# Patient Record
Sex: MALE | Race: WHITE | NOT HISPANIC OR LATINO | Employment: FULL TIME | URBAN - METROPOLITAN AREA
[De-identification: names, ages, dates, MRNs, and addresses within clinical notes are randomized per-mention and may not be internally consistent; named-entity substitution may affect disease eponyms.]

---

## 2017-02-06 ENCOUNTER — TRANSCRIBE ORDERS (OUTPATIENT)
Dept: LAB | Facility: HOSPITAL | Age: 52
End: 2017-02-06

## 2017-02-06 ENCOUNTER — APPOINTMENT (OUTPATIENT)
Dept: LAB | Facility: HOSPITAL | Age: 52
End: 2017-02-06

## 2017-02-06 DIAGNOSIS — L40.50 ARTHROPATHIC PSORIASIS, UNSPECIFIED (HCC): Primary | ICD-10-CM

## 2017-02-06 DIAGNOSIS — M72.2 PLANTAR FASCIAL FIBROMATOSIS: ICD-10-CM

## 2017-02-06 DIAGNOSIS — R94.5 ABNORMAL RESULTS OF LIVER FUNCTION STUDIES: ICD-10-CM

## 2017-02-06 LAB
ALBUMIN SERPL-MCNC: 4.2 G/DL (ref 3.2–4.8)
ALBUMIN/GLOB SERPL: 1.5 G/DL (ref 1.5–2.5)
ALP SERPL-CCNC: 92 U/L (ref 25–100)
ALT SERPL W P-5'-P-CCNC: 40 U/L (ref 7–40)
ANION GAP SERPL CALCULATED.3IONS-SCNC: 6 MMOL/L (ref 3–11)
AST SERPL-CCNC: 29 U/L (ref 0–33)
BILIRUB SERPL-MCNC: 0.3 MG/DL (ref 0.3–1.2)
BUN BLD-MCNC: 14 MG/DL (ref 9–23)
BUN/CREAT SERPL: 14 (ref 7–25)
CALCIUM SPEC-SCNC: 10 MG/DL (ref 8.7–10.4)
CHLORIDE SERPL-SCNC: 105 MMOL/L (ref 99–109)
CO2 SERPL-SCNC: 28 MMOL/L (ref 20–31)
CREAT BLD-MCNC: 1 MG/DL (ref 0.6–1.3)
DEPRECATED RDW RBC AUTO: 46.6 FL (ref 37–54)
ERYTHROCYTE [DISTWIDTH] IN BLOOD BY AUTOMATED COUNT: 13.4 % (ref 11.3–14.5)
GFR SERPL CREATININE-BSD FRML MDRD: 78 ML/MIN/1.73
GLOBULIN UR ELPH-MCNC: 2.8 GM/DL
GLUCOSE BLD-MCNC: 90 MG/DL (ref 70–100)
HCT VFR BLD AUTO: 41.5 % (ref 38.9–50.9)
HGB BLD-MCNC: 14.3 G/DL (ref 13.1–17.5)
MCH RBC QN AUTO: 32.6 PG (ref 27–31)
MCHC RBC AUTO-ENTMCNC: 34.5 G/DL (ref 32–36)
MCV RBC AUTO: 94.7 FL (ref 80–99)
PLATELET # BLD AUTO: 253 10*3/MM3 (ref 150–450)
PMV BLD AUTO: 11.3 FL (ref 6–12)
POTASSIUM BLD-SCNC: 4.1 MMOL/L (ref 3.5–5.5)
PROT SERPL-MCNC: 7 G/DL (ref 5.7–8.2)
RBC # BLD AUTO: 4.38 10*6/MM3 (ref 4.2–5.76)
SODIUM BLD-SCNC: 139 MMOL/L (ref 132–146)
WBC NRBC COR # BLD: 7.05 10*3/MM3 (ref 3.5–10.8)

## 2017-02-06 PROCEDURE — 85027 COMPLETE CBC AUTOMATED: CPT | Performed by: INTERNAL MEDICINE

## 2017-02-06 PROCEDURE — 36415 COLL VENOUS BLD VENIPUNCTURE: CPT | Performed by: INTERNAL MEDICINE

## 2017-02-06 PROCEDURE — 80053 COMPREHEN METABOLIC PANEL: CPT | Performed by: INTERNAL MEDICINE

## 2017-02-20 RX ORDER — VALACYCLOVIR HYDROCHLORIDE 1 G/1
TABLET, FILM COATED ORAL
Qty: 90 TABLET | Refills: 3 | Status: SHIPPED | OUTPATIENT
Start: 2017-02-20 | End: 2018-02-26 | Stop reason: SDUPTHER

## 2017-04-07 ENCOUNTER — TRANSCRIBE ORDERS (OUTPATIENT)
Dept: LAB | Facility: HOSPITAL | Age: 52
End: 2017-04-07

## 2017-04-07 ENCOUNTER — APPOINTMENT (OUTPATIENT)
Dept: LAB | Facility: HOSPITAL | Age: 52
End: 2017-04-07

## 2017-04-07 DIAGNOSIS — M47.812 SPONDYLOSIS OF CERVICAL REGION WITHOUT MYELOPATHY OR RADICULOPATHY: ICD-10-CM

## 2017-04-07 DIAGNOSIS — M72.2 PLANTAR FASCIAL FIBROMATOSIS: ICD-10-CM

## 2017-04-07 DIAGNOSIS — R94.5 ABNORMAL RESULTS OF LIVER FUNCTION STUDIES: ICD-10-CM

## 2017-04-07 DIAGNOSIS — L40.50 PSORIATIC ARTHROPATHY (HCC): Primary | ICD-10-CM

## 2017-04-07 DIAGNOSIS — M75.102 UNSPECIFIED ROTATOR CUFF TEAR OR RUPTURE OF LEFT SHOULDER, NOT SPECIFIED AS TRAUMATIC: ICD-10-CM

## 2017-04-07 LAB
ALBUMIN SERPL-MCNC: 4.3 G/DL (ref 3.2–4.8)
ALBUMIN/GLOB SERPL: 1.5 G/DL (ref 1.5–2.5)
ALP SERPL-CCNC: 90 U/L (ref 25–100)
ALT SERPL W P-5'-P-CCNC: 38 U/L (ref 7–40)
ANION GAP SERPL CALCULATED.3IONS-SCNC: 4 MMOL/L (ref 3–11)
AST SERPL-CCNC: 28 U/L (ref 0–33)
BILIRUB SERPL-MCNC: 0.4 MG/DL (ref 0.3–1.2)
BUN BLD-MCNC: 18 MG/DL (ref 9–23)
BUN/CREAT SERPL: 16.4 (ref 7–25)
CALCIUM SPEC-SCNC: 10.2 MG/DL (ref 8.7–10.4)
CHLORIDE SERPL-SCNC: 103 MMOL/L (ref 99–109)
CO2 SERPL-SCNC: 32 MMOL/L (ref 20–31)
CREAT BLD-MCNC: 1.1 MG/DL (ref 0.6–1.3)
DEPRECATED RDW RBC AUTO: 48.1 FL (ref 37–54)
ERYTHROCYTE [DISTWIDTH] IN BLOOD BY AUTOMATED COUNT: 13.7 % (ref 11.3–14.5)
GFR SERPL CREATININE-BSD FRML MDRD: 70 ML/MIN/1.73
GLOBULIN UR ELPH-MCNC: 2.9 GM/DL
GLUCOSE BLD-MCNC: 84 MG/DL (ref 70–100)
HCT VFR BLD AUTO: 44.7 % (ref 38.9–50.9)
HGB BLD-MCNC: 15.2 G/DL (ref 13.1–17.5)
MCH RBC QN AUTO: 33.3 PG (ref 27–31)
MCHC RBC AUTO-ENTMCNC: 34 G/DL (ref 32–36)
MCV RBC AUTO: 97.8 FL (ref 80–99)
PLATELET # BLD AUTO: 256 10*3/MM3 (ref 150–450)
PMV BLD AUTO: 11.6 FL (ref 6–12)
POTASSIUM BLD-SCNC: 4.3 MMOL/L (ref 3.5–5.5)
PROT SERPL-MCNC: 7.2 G/DL (ref 5.7–8.2)
RBC # BLD AUTO: 4.57 10*6/MM3 (ref 4.2–5.76)
SODIUM BLD-SCNC: 139 MMOL/L (ref 132–146)
WBC NRBC COR # BLD: 7.68 10*3/MM3 (ref 3.5–10.8)

## 2017-04-07 PROCEDURE — 85027 COMPLETE CBC AUTOMATED: CPT | Performed by: INTERNAL MEDICINE

## 2017-04-07 PROCEDURE — 36415 COLL VENOUS BLD VENIPUNCTURE: CPT | Performed by: INTERNAL MEDICINE

## 2017-04-07 PROCEDURE — 80053 COMPREHEN METABOLIC PANEL: CPT | Performed by: INTERNAL MEDICINE

## 2017-04-07 RX ORDER — LEVOCETIRIZINE DIHYDROCHLORIDE 5 MG/1
TABLET, FILM COATED ORAL
Qty: 90 TABLET | Refills: 3 | Status: SHIPPED | OUTPATIENT
Start: 2017-04-07 | End: 2018-04-24 | Stop reason: SDUPTHER

## 2017-06-23 RX ORDER — FLUTICASONE PROPIONATE 50 MCG
SPRAY, SUSPENSION (ML) NASAL
Qty: 48 G | Refills: 3 | Status: SHIPPED | OUTPATIENT
Start: 2017-06-23 | End: 2018-01-29 | Stop reason: SDUPTHER

## 2017-07-19 ENCOUNTER — TELEPHONE (OUTPATIENT)
Dept: SURGERY | Facility: CLINIC | Age: 52
End: 2017-07-19

## 2017-07-21 ENCOUNTER — PREP FOR SURGERY (OUTPATIENT)
Dept: OTHER | Facility: HOSPITAL | Age: 52
End: 2017-07-21

## 2017-07-21 DIAGNOSIS — Z12.11 COLON CANCER SCREENING: Primary | ICD-10-CM

## 2017-07-28 ENCOUNTER — HOSPITAL ENCOUNTER (OUTPATIENT)
Dept: GENERAL RADIOLOGY | Facility: HOSPITAL | Age: 52
Discharge: HOME | End: 2017-07-28
Attending: INTERNAL MEDICINE | Admitting: INTERNAL MEDICINE

## 2017-07-28 ENCOUNTER — OFFICE VISIT (OUTPATIENT)
Dept: INTERNAL MEDICINE | Facility: CLINIC | Age: 52
End: 2017-07-28

## 2017-07-28 VITALS
HEART RATE: 96 BPM | OXYGEN SATURATION: 95 % | WEIGHT: 192 LBS | DIASTOLIC BLOOD PRESSURE: 70 MMHG | TEMPERATURE: 98.6 F | SYSTOLIC BLOOD PRESSURE: 110 MMHG | BODY MASS INDEX: 26.88 KG/M2 | HEIGHT: 71 IN

## 2017-07-28 DIAGNOSIS — Z00.00 ROUTINE GENERAL MEDICAL EXAMINATION AT A HEALTH CARE FACILITY: Primary | ICD-10-CM

## 2017-07-28 DIAGNOSIS — Z00.00 ROUTINE GENERAL MEDICAL EXAMINATION AT A HEALTH CARE FACILITY: ICD-10-CM

## 2017-07-28 PROCEDURE — 99396 PREV VISIT EST AGE 40-64: CPT | Performed by: INTERNAL MEDICINE

## 2017-07-28 PROCEDURE — 71020 HC CHEST PA AND LATERAL: CPT

## 2017-07-28 NOTE — PROGRESS NOTES
Chief Complaint   Patient presents with   • Annual Exam     Physical       Ezio Becerra is a 51 y.o. male and is here for a comprehensive physical exam. The patient reports problems - chronic cough, bilat foot pain.     History:  No nocturia  Has ED    Do you take any herbs or supplements that were not prescribed by a doctor? yes  Are you taking calcium supplements? no  Are you taking aspirin daily? no      Health Habits:  Dental Exam. up to date  Eye Exam. unknown  Exercise: 1 times/week.  Current exercise activities include: light weights/kettlebells and walking    Health Maintenance   Topic Date Due   • HEPATITIS C SCREENING  05/27/2016   • TDAP/TD VACCINES (2 - Td) 07/28/2016   • INFLUENZA VACCINE  08/01/2017   • COLONOSCOPY  10/06/2024       PMH, PSH, SocHx, FamHx, Allergies, and Medications: Reviewed and updated in the Visit Navigator.     Allergies   Allergen Reactions   • Septra [Sulfamethoxazole-Trimethoprim]      No past medical history on file.  No past surgical history on file.  Social History     Social History   • Marital status:      Spouse name: N/A   • Number of children: N/A   • Years of education: N/A     Occupational History   • Not on file.     Social History Main Topics   • Smoking status: Never Smoker   • Smokeless tobacco: Never Used   • Alcohol use Not on file   • Drug use: Not on file   • Sexual activity: Not on file     Other Topics Concern   • Not on file     Social History Narrative     No family history on file.    Review of Systems  Review of Systems   Constitutional: Negative.  Negative for activity change, appetite change, fatigue and fever.   HENT: Negative for congestion, ear discharge, ear pain and trouble swallowing.    Eyes: Negative for photophobia and visual disturbance.   Respiratory: Positive for cough. Negative for shortness of breath.    Cardiovascular: Negative for chest pain and palpitations.   Gastrointestinal: Negative for abdominal distention, abdominal  "pain, constipation, diarrhea, nausea and vomiting.   Endocrine: Negative.    Genitourinary: Negative for dysuria, hematuria and urgency.   Musculoskeletal: Negative for arthralgias, back pain, joint swelling and myalgias.        Bilat foot pain   Skin: Negative for color change and rash.   Allergic/Immunologic: Negative.    Neurological: Negative for dizziness, weakness, light-headedness and headaches.   Hematological: Negative for adenopathy. Does not bruise/bleed easily.   Psychiatric/Behavioral: Negative for agitation, confusion and dysphoric mood. The patient is not nervous/anxious.        Vitals:    07/28/17 1539   BP: 110/70   Pulse: 96   Temp: 98.6 °F (37 °C)   SpO2: 95%       Objective   /70  Pulse 96  Temp 98.6 °F (37 °C)  Ht 71\" (180.3 cm)  Wt 192 lb (87.1 kg)  SpO2 95%  BMI 26.78 kg/m2    Physical Exam   Constitutional: He is oriented to person, place, and time. He appears well-developed and well-nourished. No distress.   HENT:   Nose: Nose normal.   Mouth/Throat: Oropharynx is clear and moist.   Eyes: Conjunctivae and EOM are normal. No scleral icterus.   Neck: No tracheal deviation present. No thyromegaly present.   Cardiovascular: Normal rate and regular rhythm.  Exam reveals no friction rub.    No murmur heard.  Pulmonary/Chest: No respiratory distress. He has no wheezes. He has no rales.   Abdominal: Soft. He exhibits no distension and no mass. There is no tenderness. There is no guarding.   Genitourinary: Prostate normal.   Musculoskeletal: Normal range of motion. He exhibits no deformity.   Lymphadenopathy:     He has no cervical adenopathy.   Neurological: He is alert and oriented to person, place, and time. He has normal reflexes. No cranial nerve deficit. Coordination normal.   Skin: Skin is warm and dry. No rash noted. No erythema.   Psychiatric: He has a normal mood and affect. His behavior is normal. Judgment and thought content normal.        Assessment/Plan   1. Healthy male " exam.   2. Patient Counseling: Including but not Limited to the following, when appropriate:  --Nutrition: Stressed importance of moderation in sodium/caffeine intake, saturated fat and cholesterol, caloric balance, sufficient intake of fresh fruits, vegetables, fiber, calcium, iron, and     --Exercise: Stressed the importance of regular exercise.       --Sexuality: Discussed sexually transmitted diseases, partner selection, use of condoms, avoidance of unintended pregnancy  and contraceptive alternatives.   --Injury prevention: Discussed safety belts, safety helmets, smoke detector, smoking near bedding or upholstery.   --Dental health: Discussed importance of regular tooth brushing, flossing, and dental visits.  --Immunizations reviewed.  --Discussed benefits of colon cancer screening.      3. Discussed the patient's BMI with him.  The BMI is in the acceptable range  4. No Follow-up on file.  5. Age-appropriate Screening Scheduled  6. There are no Patient Instructions on file for this visit.    Assessment/Plan     There are no diagnoses linked to this encounter.

## 2017-08-01 ENCOUNTER — LAB (OUTPATIENT)
Dept: LAB | Facility: HOSPITAL | Age: 52
End: 2017-08-01

## 2017-08-01 DIAGNOSIS — Z79.899 ENCOUNTER FOR LONG-TERM (CURRENT) USE OF MEDICATIONS: ICD-10-CM

## 2017-08-01 DIAGNOSIS — L40.50 ARTHROPATHIC PSORIASIS (HCC): Primary | ICD-10-CM

## 2017-08-01 DIAGNOSIS — R94.5 NONSPECIFIC ABNORMAL RESULTS OF LIVER FUNCTION STUDY: ICD-10-CM

## 2017-08-01 DIAGNOSIS — M47.812 SPONDYLOSIS WITHOUT MYELOPATHY OR RADICULOPATHY, CERVICAL REGION: ICD-10-CM

## 2017-08-01 DIAGNOSIS — M72.2 PLANTAR FASCIAL FIBROMATOSIS: ICD-10-CM

## 2017-08-01 LAB
ALBUMIN SERPL-MCNC: 4.1 G/DL (ref 3.2–4.8)
ALBUMIN/GLOB SERPL: 1.3 G/DL (ref 1.5–2.5)
ALP SERPL-CCNC: 84 U/L (ref 25–100)
ALT SERPL W P-5'-P-CCNC: 47 U/L (ref 7–40)
ANION GAP SERPL CALCULATED.3IONS-SCNC: 4 MMOL/L (ref 3–11)
ARTICHOKE IGE QN: 129 MG/DL (ref 0–130)
AST SERPL-CCNC: 38 U/L (ref 0–33)
BILIRUB SERPL-MCNC: 0.4 MG/DL (ref 0.3–1.2)
BUN BLD-MCNC: 17 MG/DL (ref 9–23)
BUN/CREAT SERPL: 15.5 (ref 7–25)
CALCIUM SPEC-SCNC: 9.4 MG/DL (ref 8.7–10.4)
CHLORIDE SERPL-SCNC: 104 MMOL/L (ref 99–109)
CHOLEST SERPL-MCNC: 185 MG/DL (ref 0–200)
CO2 SERPL-SCNC: 30 MMOL/L (ref 20–31)
CREAT BLD-MCNC: 1.1 MG/DL (ref 0.6–1.3)
DEPRECATED RDW RBC AUTO: 48.4 FL (ref 37–54)
ERYTHROCYTE [DISTWIDTH] IN BLOOD BY AUTOMATED COUNT: 13.7 % (ref 11.3–14.5)
GFR SERPL CREATININE-BSD FRML MDRD: 70 ML/MIN/1.73
GLOBULIN UR ELPH-MCNC: 3.1 GM/DL
GLUCOSE BLD-MCNC: 90 MG/DL (ref 70–100)
HCT VFR BLD AUTO: 44.8 % (ref 38.9–50.9)
HDLC SERPL-MCNC: 36 MG/DL (ref 40–60)
HGB BLD-MCNC: 15.1 G/DL (ref 13.1–17.5)
MCH RBC QN AUTO: 32.8 PG (ref 27–31)
MCHC RBC AUTO-ENTMCNC: 33.7 G/DL (ref 32–36)
MCV RBC AUTO: 97.4 FL (ref 80–99)
PLATELET # BLD AUTO: 199 10*3/MM3 (ref 150–450)
PMV BLD AUTO: 11.1 FL (ref 6–12)
POTASSIUM BLD-SCNC: 4.4 MMOL/L (ref 3.5–5.5)
PROT SERPL-MCNC: 7.2 G/DL (ref 5.7–8.2)
PSA SERPL-MCNC: 0.91 NG/ML (ref 0–4)
RBC # BLD AUTO: 4.6 10*6/MM3 (ref 4.2–5.76)
SODIUM BLD-SCNC: 138 MMOL/L (ref 132–146)
TRIGL SERPL-MCNC: 193 MG/DL (ref 0–150)
WBC NRBC COR # BLD: 8.36 10*3/MM3 (ref 3.5–10.8)

## 2017-08-01 PROCEDURE — 80053 COMPREHEN METABOLIC PANEL: CPT | Performed by: INTERNAL MEDICINE

## 2017-08-01 PROCEDURE — 80061 LIPID PANEL: CPT | Performed by: INTERNAL MEDICINE

## 2017-08-01 PROCEDURE — 85027 COMPLETE CBC AUTOMATED: CPT | Performed by: INTERNAL MEDICINE

## 2017-08-01 PROCEDURE — 36415 COLL VENOUS BLD VENIPUNCTURE: CPT | Performed by: INTERNAL MEDICINE

## 2017-08-01 PROCEDURE — 84153 ASSAY OF PSA TOTAL: CPT | Performed by: INTERNAL MEDICINE

## 2017-08-04 RX ORDER — PSEUDOEPHEDRINE HCL 120 MG/1
60 TABLET, FILM COATED, EXTENDED RELEASE ORAL DAILY
COMMUNITY
End: 2022-04-01

## 2017-08-11 ENCOUNTER — HOSPITAL ENCOUNTER (OUTPATIENT)
Facility: HOSPITAL | Age: 52
Setting detail: HOSPITAL OUTPATIENT SURGERY
Discharge: HOME | End: 2017-08-11
Attending: SURGERY | Admitting: SURGERY

## 2017-08-11 ENCOUNTER — ANESTHESIA EVENT (OUTPATIENT)
Dept: GASTROENTEROLOGY | Facility: HOSPITAL | Age: 52
End: 2017-08-11

## 2017-08-11 ENCOUNTER — ANESTHESIA (OUTPATIENT)
Dept: GASTROENTEROLOGY | Facility: HOSPITAL | Age: 52
End: 2017-08-11

## 2017-08-11 VITALS
BODY MASS INDEX: 25.9 KG/M2 | WEIGHT: 185 LBS | DIASTOLIC BLOOD PRESSURE: 75 MMHG | OXYGEN SATURATION: 97 % | RESPIRATION RATE: 16 BRPM | TEMPERATURE: 97.8 F | SYSTOLIC BLOOD PRESSURE: 114 MMHG | HEART RATE: 67 BPM | HEIGHT: 71 IN

## 2017-08-11 PROCEDURE — 25010000002 MIDAZOLAM PER 1 MG: Performed by: NURSE ANESTHETIST, CERTIFIED REGISTERED

## 2017-08-11 PROCEDURE — 25010000002 PROPOFOL 1000 MG/100ML EMULSION: Performed by: NURSE ANESTHETIST, CERTIFIED REGISTERED

## 2017-08-11 PROCEDURE — 25010000002 FENTANYL CITRATE (PF) 100 MCG/2ML SOLUTION: Performed by: NURSE ANESTHETIST, CERTIFIED REGISTERED

## 2017-08-11 PROCEDURE — S0260 H&P FOR SURGERY: HCPCS | Performed by: SURGERY

## 2017-08-11 RX ORDER — PROPOFOL 10 MG/ML
INJECTION, EMULSION INTRAVENOUS AS NEEDED
Status: DISCONTINUED | OUTPATIENT
Start: 2017-08-11 | End: 2017-08-11 | Stop reason: SURG

## 2017-08-11 RX ORDER — SODIUM CHLORIDE, SODIUM LACTATE, POTASSIUM CHLORIDE, CALCIUM CHLORIDE 600; 310; 30; 20 MG/100ML; MG/100ML; MG/100ML; MG/100ML
1000 INJECTION, SOLUTION INTRAVENOUS CONTINUOUS PRN
Status: DISCONTINUED | OUTPATIENT
Start: 2017-08-11 | End: 2017-08-11 | Stop reason: HOSPADM

## 2017-08-11 RX ORDER — FENTANYL CITRATE 50 UG/ML
INJECTION, SOLUTION INTRAMUSCULAR; INTRAVENOUS AS NEEDED
Status: DISCONTINUED | OUTPATIENT
Start: 2017-08-11 | End: 2017-08-11 | Stop reason: SURG

## 2017-08-11 RX ORDER — SODIUM CHLORIDE 0.9 % (FLUSH) 0.9 %
3 SYRINGE (ML) INJECTION AS NEEDED
Status: DISCONTINUED | OUTPATIENT
Start: 2017-08-11 | End: 2017-08-11 | Stop reason: HOSPADM

## 2017-08-11 RX ORDER — ONDANSETRON 2 MG/ML
4 INJECTION INTRAMUSCULAR; INTRAVENOUS ONCE AS NEEDED
Status: DISCONTINUED | OUTPATIENT
Start: 2017-08-11 | End: 2017-08-11 | Stop reason: HOSPADM

## 2017-08-11 RX ORDER — MIDAZOLAM HYDROCHLORIDE 1 MG/ML
INJECTION INTRAMUSCULAR; INTRAVENOUS AS NEEDED
Status: DISCONTINUED | OUTPATIENT
Start: 2017-08-11 | End: 2017-08-11 | Stop reason: SURG

## 2017-08-11 RX ADMIN — SODIUM CHLORIDE, SODIUM LACTATE, POTASSIUM CHLORIDE, AND CALCIUM CHLORIDE 1000 ML: 600; 310; 30; 20 INJECTION, SOLUTION INTRAVENOUS at 08:06

## 2017-08-11 RX ADMIN — FENTANYL CITRATE 100 MCG: 50 INJECTION, SOLUTION INTRAMUSCULAR; INTRAVENOUS at 08:20

## 2017-08-11 RX ADMIN — MIDAZOLAM HYDROCHLORIDE 2 MG: 1 INJECTION, SOLUTION INTRAMUSCULAR; INTRAVENOUS at 08:17

## 2017-08-11 RX ADMIN — PROPOFOL 50 MG: 10 INJECTION, EMULSION INTRAVENOUS at 08:19

## 2017-08-11 NOTE — ANESTHESIA PREPROCEDURE EVALUATION
Anesthesia Evaluation     Patient summary reviewed and Nursing notes reviewed   NPO Solid Status: > 8 hours  NPO Liquid Status: > 8 hours     Airway   no difficulty expected  Dental - normal exam     Pulmonary - normal exam   Cardiovascular - normal exam  Exercise tolerance: unable to assess        Neuro/Psych  GI/Hepatic/Renal/Endo      Musculoskeletal     (+) arthralgias, back pain, chronic pain,   Abdominal  - normal exam   Substance History      OB/GYN          Other   (+) arthritis                                     Anesthesia Plan    ASA 3     MAC   (Risks and benefits discussed including risk of aspiration, recall and dental damage. All patient questions answered. Will continue with POC.)  intravenous induction   Anesthetic plan and risks discussed with patient.

## 2017-08-11 NOTE — PLAN OF CARE
Problem: GI Endoscopy (NICU)  Goal: Signs and Symptoms of Listed Potential Problems Will be Absent or Manageable (GI Endoscopy)  Outcome: Ongoing (interventions implemented as appropriate)

## 2017-08-11 NOTE — ANESTHESIA POSTPROCEDURE EVALUATION
Patient: Ezio Becerra    Procedure Summary     Date Anesthesia Start Anesthesia Stop Room / Location    08/11/17 0815  [facility] ENDOSCOPY 3 / [facility]COPY       Procedure Diagnosis Surgeon Provider    COLONOSCOPY (N/A ) Colon cancer screening  (Colon cancer screening [Z12.11]) MD Antione Yoder CRNA          Anesthesia Type: MAC  Last vitals  BP   115/65 (08/11/17 0801)    Temp   98 °F (36.7 °C) (08/11/17 0801)    Pulse   73 (08/11/17 0801)   Resp   16 (08/11/17 0801)    SpO2   97 % (08/11/17 0801)      Post Anesthesia Care and Evaluation    Patient location during evaluation: PHASE II  Patient participation: complete - patient participated  Level of consciousness: awake  Pain score: 0  Pain management: adequate  Airway patency: patent  Anesthetic complications: No anesthetic complications  PONV Status: none  Cardiovascular status: acceptable  Respiratory status: acceptable and nasal cannula  Hydration status: acceptable    Comments: vsss resp spont, reflexes intact, responsive, report given to pacu nurse

## 2017-08-11 NOTE — H&P
[facility]   HISTORY AND PHYSICAL      Name:  Ezio Becerra   Age:  51 y.o.  Sex:  male  :  1965  MRN:  [id number]   Visit Number:  [id number]  Admission Date:  2017  Date Of Service:  17  Primary Care Physician:  Ethan Garcia MD    Chief Complaint:     History of colon polyps    History Of Presenting Illness:      Patient here for 5 year follow-up colonoscopy with polyp.  No symptoms and positive family history grandfather having colon cancer.  Symptoms    Review Of Systems:     General ROS: Patient denies any fevers, chills or loss of consciousness.  No complaints of generalized weakness  Psychological ROS: Denies any hallucinations and delusions.  Ophthalmic ROS: no transient loss of vision.  ENT ROS: Denies sore throat, nasal congestion or ear pain.   Allergy and Immunology ROS: Denies rash or itching.  Hematological and Lymphatic ROS: Denies neck swelling or easy bleeding.  Endocrine ROS: Denies any recent unintentional weight gain or loss.  Breast ROS: Denies any pain or swelling.  Respiratory ROS: Denies cough or shortness of breath.   Cardiovascular ROS: Denies chest pain or palpitations. No history of exertional chest pain.   Gastrointestinal ROS: Denies nausea and vomiting. Denies any abdominal pain. No diarrhea.   Genito-Urinary ROS: Denies dysuria or hematuria.  Musculoskeletal ROS: no back pain. No muscle pain. No calf pain.   Neurological ROS: Denies any focal weakness. No loss of consciousness. Denies any numbness.   Dermatological ROS: Denies any redness or pruritis.     Past Medical History:    Past Medical History:   Diagnosis Date   • DDD (degenerative disc disease), cervical    • DDD (degenerative disc disease), lumbar    • Plantar fasciitis     BILATERAL   • Psoriatic arthritis    • Seasonal allergies        Past Surgical history:    Past Surgical History:   Procedure Laterality Date   • COLONOSCOPY     • WISDOM TOOTH EXTRACTION          Social History:    Social History     Social History   • Marital status:      Spouse name: N/A   • Number of children: N/A   • Years of education: N/A     Occupational History   • Not on file.     Social History Main Topics   • Smoking status: Never Smoker   • Smokeless tobacco: Never Used   • Alcohol use Yes      Comment: OCCASSIONAL   • Drug use: No   • Sexual activity: Defer     Other Topics Concern   • Not on file     Social History Narrative       Family History:    History reviewed. No pertinent family history.    Allergies:      Septra [sulfamethoxazole-trimethoprim]    Home Medications:    Prior to Admission Medications     Prescriptions Last Dose Informant Patient Reported? Taking?    fluticasone (FLONASE) 50 MCG/ACT nasal spray 8/11/2017  No Yes    USE 2 SPRAYS IN EACH       NOSTRIL ONCE DAILY, PLEASE MAKE AN APPOINTMENT WITH   YOUR DOCTOR    Fluticasone Furoate-Vilanterol (BREO ELLIPTA IN) 8/11/2017  Yes Yes    Inhale 1 puff Daily.    folic acid (FOLVITE) 1 MG tablet 8/10/2017  Yes Yes    Take 1 mg by mouth Daily.    levocetirizine (XYZAL) 5 MG tablet 8/10/2017  No Yes    TAKE 1 TABLET DAILY. PLEASEMAKE AN APPOINTMENT WITH   YOUR DOCTOR    methotrexate 2.5 MG tablet 8/10/2017  Yes Yes    Take 2.5 mg by mouth. Take 6 tablets once a week.    naproxen (NAPROSYN) 500 MG tablet Past Week  Yes Yes    Take 500 mg by mouth 3 (Three) Times a Day As Needed.    pimecrolimus (ELIDEL) 1 % cream More than a month  Yes No    Apply 1 application topically 2 (Two) Times a Day As Needed.    pseudoephedrine (SUDAFED) 120 MG 12 hr tablet 8/10/2017  Yes Yes    Take 60 mg by mouth Daily.    sildenafil (VIAGRA) 100 MG tablet Past Month  Yes Yes    Take 100 mg by mouth As Needed.    tacrolimus (PROTOPIC) 0.1 % ointment Past Week  Yes Yes    Apply 1 application topically Daily As Needed.    valACYclovir (VALTREX) 1000 MG tablet 8/10/2017  No Yes    TAKE 1 TABLET DAILY             ED Medications:    Medications    sodium chloride 0.9 % flush 3 mL (not administered)   lactated ringers infusion 1,000 mL (1,000 mL Intravenous New Bag 8/11/17 0806)       Vital Signs:    Temp:  [98 °F (36.7 °C)] 98 °F (36.7 °C)  Heart Rate:  [73] 73  Resp:  [16] 16  BP: (115)/(65) 115/65    Last 3 weights    08/04/17  1101   Weight: 185 lb (83.9 kg)       Body mass index is 25.8 kg/(m^2).    Physical Exam:      General Appearance:  Alert and cooperative, not in any acute distress.   Head:  Atraumatic and normocephalic, without obvious abnormality.   Eyes:          PERRLA, conjunctivae and sclerae normal, no Icterus. No pallor. Extra-occular movements are within normal limits.   Ears:  Ears appear intact with no abnormalities noted.   Throat: No oral lesions, no thrush, oral mucosa moist.   Neck: Supple, trachea midline, no thyromegaly, no carotid bruit.       Respiratory/Lungs:   Breath sounds heard bilaterally equally.  No crackles or wheezing. No Pleural rub or bronchial breathing. Normal respiratory effort.    Cardiovascular/Heart:  Normal S1 and S2, no murmur. No edema   GI/Abdomen:   No masses, no hepatosplenomegaly. Soft, non-tender, non-distended, no hernia                 Musculoskeletal/ Extremities:   Moves all extremities well   Pulses: Pulses palpable and equal bilaterally   Skin: No bleeding, bruising or rash, no induration   Lymph nodes: No palpable adenopathy   Psychiatric : Alert and oriented ×3.  No depression or anxiety            EKG:      None    Labs:    Lab Results (last 24 hours)     ** No results found for the last 24 hours. **          Radiology:    Imaging Results (last 72 hours)     ** No results found for the last 72 hours. **          Assessment:    Family history colon cancer, personal history of colon polyps     Plan:     Colonoscopy today, risk of bleeding perforation discussed and patient agreeable    Joseph Rucker MD  08/11/17  8:16 AM

## 2017-08-11 NOTE — PLAN OF CARE
Problem: GI Endoscopy (NICU)  Goal: Signs and Symptoms of Listed Potential Problems Will be Absent or Manageable (GI Endoscopy)  Outcome: Ongoing (interventions implemented as appropriate)    08/11/17 0752   GI Endoscopy   Problems Assessed (GI Endoscopy) all   Problems Present (GI Endoscopy) none

## 2017-08-18 ENCOUNTER — TELEPHONE (OUTPATIENT)
Dept: INTERNAL MEDICINE | Facility: CLINIC | Age: 52
End: 2017-08-18

## 2017-08-18 DIAGNOSIS — R05.9 COUGH: Primary | ICD-10-CM

## 2017-08-18 NOTE — TELEPHONE ENCOUNTER
Patient called stating that he had seen Dr. Jose nielsen ago and he was given a medication to try. He states he told him to let him know if he decided to see a specialist, (Pulmonology) and he would order a referral. Patient would like to do this, if possible.

## 2017-09-05 ENCOUNTER — TELEPHONE (OUTPATIENT)
Dept: INTERNAL MEDICINE | Facility: CLINIC | Age: 52
End: 2017-09-05

## 2017-09-05 NOTE — TELEPHONE ENCOUNTER
----- Message from Ezio Becerra sent at 9/1/2017 10:01 AM EDT -----  Regarding: RE: Non-Urgent Medical Question  Contact: [phone]  I'm due for a refill on my methotrexate.  Would like to get 90 day supply from [employer], 6 tabs/week.      Do I need to schedule an rosa't to get a refill or should I schedule my next rosa't for Nov?    My most recent labwork from Aug 1 is on the portal.    Sim  ----- Message -----  From: Ethan Garcia MD  Sent: 8/31/2017  6:06 PM EDT  To: Ezio Becerra  Subject: RE: Non-Urgent Medical Question    Yes we can take care of it here. We will need to see you every 6 months and monitor your labs.    ----- Message -----     From: Ezio Becerra     Sent: 8/30/2017  9:15 PM EDT       To: Ethan Garcia MD  Subject: Non-Urgent Medical Question    I am currently under maintenance dose of six 2.5 mg tablets/week of methotrexate.  I currently see Dr Cheney for semi annual check ups.  Is this something I can do through your office?

## 2017-09-06 ENCOUNTER — TELEPHONE (OUTPATIENT)
Dept: INTERNAL MEDICINE | Facility: CLINIC | Age: 52
End: 2017-09-06
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
## 2017-09-08 ENCOUNTER — CLINICAL SUPPORT (OUTPATIENT)
Dept: INTERNAL MEDICINE | Facility: CLINIC | Age: 52
End: 2017-09-08

## 2017-09-08 PROCEDURE — 90715 TDAP VACCINE 7 YRS/> IM: CPT | Performed by: INTERNAL MEDICINE

## 2017-09-08 PROCEDURE — 90471 IMMUNIZATION ADMIN: CPT | Performed by: INTERNAL MEDICINE

## 2017-10-09 ENCOUNTER — APPOINTMENT (OUTPATIENT)
Dept: LAB | Facility: HOSPITAL | Age: 52
End: 2017-10-09

## 2017-10-09 ENCOUNTER — TRANSCRIBE ORDERS (OUTPATIENT)
Dept: LAB | Facility: HOSPITAL | Age: 52
End: 2017-10-09

## 2017-10-09 DIAGNOSIS — L40.50 PSORIATIC ARTHROPATHY (HCC): ICD-10-CM

## 2017-10-09 DIAGNOSIS — Z79.899 HIGH RISK MEDICATIONS (NOT ANTICOAGULANTS) LONG-TERM USE: Primary | ICD-10-CM

## 2017-10-09 DIAGNOSIS — R94.5 LIVER FUNCTION STUDY, ABNORMAL: ICD-10-CM

## 2017-10-09 DIAGNOSIS — M72.2 PLANTAR FASCIAL FIBROMATOSIS: ICD-10-CM

## 2017-10-09 LAB
ALBUMIN SERPL-MCNC: 4.2 G/DL (ref 3.2–4.8)
ALBUMIN/GLOB SERPL: 1.5 G/DL (ref 1.5–2.5)
ALP SERPL-CCNC: 97 U/L (ref 25–100)
ALT SERPL W P-5'-P-CCNC: 39 U/L (ref 7–40)
ANION GAP SERPL CALCULATED.3IONS-SCNC: 6 MMOL/L (ref 3–11)
AST SERPL-CCNC: 27 U/L (ref 0–33)
BILIRUB SERPL-MCNC: 0.5 MG/DL (ref 0.3–1.2)
BUN BLD-MCNC: 16 MG/DL (ref 9–23)
BUN/CREAT SERPL: 16 (ref 7–25)
CALCIUM SPEC-SCNC: 9.4 MG/DL (ref 8.7–10.4)
CHLORIDE SERPL-SCNC: 110 MMOL/L (ref 99–109)
CO2 SERPL-SCNC: 26 MMOL/L (ref 20–31)
CREAT BLD-MCNC: 1 MG/DL (ref 0.6–1.3)
DEPRECATED RDW RBC AUTO: 47.6 FL (ref 37–54)
ERYTHROCYTE [DISTWIDTH] IN BLOOD BY AUTOMATED COUNT: 13.7 % (ref 11.3–14.5)
GFR SERPL CREATININE-BSD FRML MDRD: 78 ML/MIN/1.73
GLOBULIN UR ELPH-MCNC: 2.8 GM/DL
GLUCOSE BLD-MCNC: 73 MG/DL (ref 70–100)
HCT VFR BLD AUTO: 44.5 % (ref 38.9–50.9)
HGB BLD-MCNC: 15.4 G/DL (ref 13.1–17.5)
MCH RBC QN AUTO: 33 PG (ref 27–31)
MCHC RBC AUTO-ENTMCNC: 34.6 G/DL (ref 32–36)
MCV RBC AUTO: 95.3 FL (ref 80–99)
PLATELET # BLD AUTO: 244 10*3/MM3 (ref 150–450)
PMV BLD AUTO: 11.3 FL (ref 6–12)
POTASSIUM BLD-SCNC: 4.1 MMOL/L (ref 3.5–5.5)
PROT SERPL-MCNC: 7 G/DL (ref 5.7–8.2)
RBC # BLD AUTO: 4.67 10*6/MM3 (ref 4.2–5.76)
SODIUM BLD-SCNC: 142 MMOL/L (ref 132–146)
WBC NRBC COR # BLD: 7.25 10*3/MM3 (ref 3.5–10.8)

## 2017-10-09 PROCEDURE — 80053 COMPREHEN METABOLIC PANEL: CPT | Performed by: INTERNAL MEDICINE

## 2017-10-09 PROCEDURE — 36415 COLL VENOUS BLD VENIPUNCTURE: CPT | Performed by: INTERNAL MEDICINE

## 2017-10-09 PROCEDURE — 85027 COMPLETE CBC AUTOMATED: CPT | Performed by: INTERNAL MEDICINE

## 2017-10-12 ENCOUNTER — TELEPHONE (OUTPATIENT)
Dept: INTERNAL MEDICINE | Facility: CLINIC | Age: 52
End: 2017-10-12

## 2017-10-12 NOTE — TELEPHONE ENCOUNTER
----- Message from Ethan Garcia MD sent at 10/11/2017 11:55 AM EDT -----  Please inform patient labs are okay.

## 2017-10-13 ENCOUNTER — TELEPHONE (OUTPATIENT)
Dept: INTERNAL MEDICINE | Facility: CLINIC | Age: 52
End: 2017-10-13

## 2017-11-01 ENCOUNTER — OFFICE VISIT (OUTPATIENT)
Dept: PULMONOLOGY | Facility: CLINIC | Age: 52
End: 2017-11-01

## 2017-11-01 VITALS
RESPIRATION RATE: 16 BRPM | DIASTOLIC BLOOD PRESSURE: 78 MMHG | SYSTOLIC BLOOD PRESSURE: 110 MMHG | BODY MASS INDEX: 27.16 KG/M2 | WEIGHT: 194 LBS | HEART RATE: 84 BPM | OXYGEN SATURATION: 97 % | HEIGHT: 71 IN

## 2017-11-01 DIAGNOSIS — J30.89 OTHER ALLERGIC RHINITIS: ICD-10-CM

## 2017-11-01 DIAGNOSIS — R06.83 SNORING: ICD-10-CM

## 2017-11-01 DIAGNOSIS — R05.3 COUGH, PERSISTENT: Primary | ICD-10-CM

## 2017-11-01 PROCEDURE — 99244 OFF/OP CNSLTJ NEW/EST MOD 40: CPT | Performed by: INTERNAL MEDICINE

## 2017-11-01 RX ORDER — AZELASTINE 1 MG/ML
1 SPRAY, METERED NASAL 2 TIMES DAILY PRN
Qty: 1 EACH | Refills: 1 | Status: SHIPPED | OUTPATIENT
Start: 2017-11-01 | End: 2019-02-01

## 2017-11-01 NOTE — PROGRESS NOTES
"CONSULT NOTE    Requested by: Ethan Garcia MD      Chief Complaint   Patient presents with   • Consult   • Cough       Subjective   Ezio Becerra is a 52 y.o. male.     History of Present Illness   Patient comes in today for consultation because of cough.  The patient says that over the past 2-3 years he's had a bronchitis once a year and usually keeps a cough for about a few weeks after that but this year the cough has persisted.  Upon questioning he denies any exacerbating or triggering factors.  He also denies any fever or chills.    The patient denies any more or pets at home.  The patient denies any family history of asthma or personal history of asthma.    The patient does complain of runny nose and dribbling in the back of his throat which is usually well controlled with Flonase although occasionally it does worsen.    The patient says that the cough is productive of clear sputum and is mostly a problem early in the morning.  He denies any symptoms as the day progresses.  The patient does not have any shortness of breath.    The patient used to take allergy shots but stopped them 10 years ago.  He does have occasional seasonal allergies.    The patient has tried Prilosec as well as Brio but stopped using them as he did not feel that they helped his symptoms.  He does not have daily symptoms and sometimes will have no cough whatsoever for 1 week before having a \"bad day\".    The following portions of the patient's history were reviewed and updated as appropriate: allergies, current medications, past family history, past medical history, past social history and past surgical history.    Review of Systems   Constitutional: Negative for chills, fatigue and fever.   HENT: Negative for congestion, postnasal drip, rhinorrhea, sinus pressure, sneezing and sore throat.    Respiratory: Positive for cough and wheezing. Negative for chest tightness and shortness of breath.    Cardiovascular: Negative for chest " "pain, palpitations and leg swelling.   Psychiatric/Behavioral: Positive for sleep disturbance.   All other systems reviewed and are negative.      Past Medical History:   Diagnosis Date   • DDD (degenerative disc disease), cervical    • DDD (degenerative disc disease), lumbar    • Plantar fasciitis     BILATERAL   • Psoriatic arthritis    • Seasonal allergies        Social History   Substance Use Topics   • Smoking status: Never Smoker   • Smokeless tobacco: Never Used   • Alcohol use Yes      Comment: OCCASSIONAL       Objective   Visit Vitals   • /78   • Pulse 84   • Resp 16   • Ht 71\" (180.3 cm)   • Wt 194 lb (88 kg)   • SpO2 97%   • BMI 27.06 kg/m2       Physical Exam   Constitutional: He is oriented to person, place, and time. He appears well-developed and well-nourished.   HENT:   Sinuses were non tender on palpation.  Nostril showed mild erythema.  Oropharynx was moist.   Eyes: EOM are normal.   Neck: Neck supple. No JVD present. No thyromegaly present.   Cardiovascular: Normal rate and regular rhythm.    No murmur heard.  Pulmonary/Chest: Effort normal. No respiratory distress. He has no wheezes. He has no rales.   Musculoskeletal:   Gait was normal.   Neurological: He is alert and oriented to person, place, and time.   Skin: Skin is warm and dry.   Psychiatric: He has a normal mood and affect. His behavior is normal.   Vitals reviewed.      Assessment/Plan   Ezio was seen today for consult and cough.    Diagnoses and all orders for this visit:    Cough, persistent    Other allergic rhinitis    Snoring    Other orders  -     azelastine (ASTELIN) 0.1 % nasal spray; 1 spray into each nostril 2 (Two) Times a Day As Needed for Rhinitis or Allergies. Use in each nostril as directed         Return in about 5 months (around 4/1/2018) for Recheck.    DISCUSSION(if any):  I have reviewed the patient's imaging studies and shared them with him. The last Chest X Ray was normal.     ===========================  " ===========================    His cough is likely secondary to Post Nasal Drip likely from allergic rhinitis.    I have advised him to use Flonase BID and also given him a prescription for Azelastine for PRN use.     If he continues to have cough or if the frequency increases or symptoms worsen, then I will consider further work up.    He doesn't seem to have any symptoms to suggest asthma.       Dictated utilizing Dragon dictation.    This document was electronically signed by Florentino Martinez MD November 1, 2017  11:46 AM

## 2017-11-10 ENCOUNTER — OFFICE VISIT (OUTPATIENT)
Dept: INTERNAL MEDICINE | Facility: CLINIC | Age: 52
End: 2017-11-10

## 2017-11-10 VITALS
DIASTOLIC BLOOD PRESSURE: 81 MMHG | HEART RATE: 90 BPM | HEIGHT: 71 IN | SYSTOLIC BLOOD PRESSURE: 115 MMHG | OXYGEN SATURATION: 98 % | BODY MASS INDEX: 27.02 KG/M2 | TEMPERATURE: 98 F | WEIGHT: 193 LBS

## 2017-11-10 DIAGNOSIS — R05.9 COUGH: Primary | ICD-10-CM

## 2017-11-10 DIAGNOSIS — L40.9 PSORIASIS: ICD-10-CM

## 2017-11-10 DIAGNOSIS — N52.9 MALE ERECTILE DISORDER: ICD-10-CM

## 2017-11-10 PROCEDURE — 99214 OFFICE O/P EST MOD 30 MIN: CPT | Performed by: INTERNAL MEDICINE

## 2018-01-15 RX ORDER — FOLIC ACID 1 MG/1
1 TABLET ORAL DAILY
Qty: 100 TABLET | Refills: 3 | Status: SHIPPED | OUTPATIENT
Start: 2018-01-15 | End: 2019-02-01

## 2018-01-29 RX ORDER — FLUTICASONE PROPIONATE 50 MCG
2 SPRAY, SUSPENSION (ML) NASAL DAILY
Qty: 48 G | Refills: 3 | Status: SHIPPED | OUTPATIENT
Start: 2018-01-29 | End: 2019-02-01 | Stop reason: SDUPTHER

## 2018-02-02 DIAGNOSIS — L40.9 PSORIASIS: Primary | ICD-10-CM

## 2018-02-02 LAB
ALBUMIN SERPL-MCNC: 4.4 G/DL (ref 3.5–5)
ALBUMIN/GLOB SERPL: 1.5 G/DL (ref 1–2)
ALP SERPL-CCNC: 86 U/L (ref 38–126)
ALT SERPL-CCNC: 66 U/L (ref 13–69)
AST SERPL-CCNC: 34 U/L (ref 15–46)
BILIRUB SERPL-MCNC: 0.4 MG/DL (ref 0.2–1.3)
BUN SERPL-MCNC: 22 MG/DL (ref 7–20)
BUN/CREAT SERPL: 18.3 (ref 6.3–21.9)
CALCIUM SERPL-MCNC: 10.5 MG/DL (ref 8.4–10.2)
CHLORIDE SERPL-SCNC: 102 MMOL/L (ref 98–107)
CO2 SERPL-SCNC: 29 MMOL/L (ref 26–30)
CREAT SERPL-MCNC: 1.2 MG/DL (ref 0.6–1.3)
ERYTHROCYTE [DISTWIDTH] IN BLOOD BY AUTOMATED COUNT: 13.2 % (ref 11.5–14.5)
GFR SERPLBLD CREATININE-BSD FMLA CKD-EPI: 63 ML/MIN/1.73
GFR SERPLBLD CREATININE-BSD FMLA CKD-EPI: 77 ML/MIN/1.73
GLOBULIN SER CALC-MCNC: 2.9 GM/DL
GLUCOSE SERPL-MCNC: 73 MG/DL (ref 74–98)
HCT VFR BLD AUTO: 44 % (ref 42–52)
HGB BLD-MCNC: 15.3 G/DL (ref 14–18)
MCH RBC QN AUTO: 33.3 PG (ref 27–31)
MCHC RBC AUTO-ENTMCNC: 34.8 G/DL (ref 30–37)
MCV RBC AUTO: 95.9 FL (ref 80–94)
PLATELET # BLD AUTO: 270 10*3/MM3 (ref 130–400)
POTASSIUM SERPL-SCNC: 4.7 MMOL/L (ref 3.5–5.1)
PROT SERPL-MCNC: 7.3 G/DL (ref 6.3–8.2)
RBC # BLD AUTO: 4.59 10*6/MM3 (ref 4.7–6.1)
SODIUM SERPL-SCNC: 143 MMOL/L (ref 137–145)
WBC # BLD AUTO: 8.63 10*3/MM3 (ref 4.8–10.8)

## 2018-02-26 ENCOUNTER — TELEPHONE (OUTPATIENT)
Dept: INTERNAL MEDICINE | Facility: CLINIC | Age: 53
End: 2018-02-26

## 2018-02-26 RX ORDER — VALACYCLOVIR HYDROCHLORIDE 1 G/1
1000 TABLET, FILM COATED ORAL DAILY
Qty: 90 TABLET | Refills: 3 | Status: SHIPPED | OUTPATIENT
Start: 2018-02-26 | End: 2019-03-03 | Stop reason: SDUPTHER

## 2018-02-26 NOTE — TELEPHONE ENCOUNTER
----- Message from Ezio Becerra sent at 2/24/2018  5:45 PM EST -----  Regarding: Prescription Question  Contact: [phone]  Request refills for 90 day supply of generic Valtrex equivalent thru mail order [facility], phone # [phone]

## 2018-02-26 NOTE — TELEPHONE ENCOUNTER
Patients medication has been sent to the pharmacy.    Patient has been notified via [employer].

## 2018-04-23 ENCOUNTER — PATIENT MESSAGE (OUTPATIENT)
Dept: INTERNAL MEDICINE | Facility: CLINIC | Age: 53
End: 2018-04-23

## 2018-04-24 RX ORDER — LEVOCETIRIZINE DIHYDROCHLORIDE 5 MG/1
5 TABLET, FILM COATED ORAL EVERY EVENING
Qty: 90 TABLET | Refills: 3 | Status: SHIPPED | OUTPATIENT
Start: 2018-04-24 | End: 2019-02-01 | Stop reason: SDUPTHER

## 2018-04-24 NOTE — TELEPHONE ENCOUNTER
From: Ezio Becerra  To: Ethan Garcia MD  Sent: 4/23/2018 8:26 PM EDT  Subject: Prescription Question    Request a refill of levocetirizine 5 mg, 90 tablets through mail order [facility] [phone]

## 2018-04-25 DIAGNOSIS — M25.561 RIGHT KNEE PAIN, UNSPECIFIED CHRONICITY: Primary | ICD-10-CM

## 2018-04-30 ENCOUNTER — OFFICE VISIT (OUTPATIENT)
Dept: ORTHOPEDIC SURGERY | Facility: CLINIC | Age: 53
End: 2018-04-30

## 2018-04-30 VITALS — WEIGHT: 195 LBS | BODY MASS INDEX: 27.3 KG/M2 | RESPIRATION RATE: 18 BRPM | HEIGHT: 71 IN

## 2018-04-30 DIAGNOSIS — M25.561 RIGHT KNEE PAIN, UNSPECIFIED CHRONICITY: Primary | ICD-10-CM

## 2018-04-30 PROCEDURE — 99204 OFFICE O/P NEW MOD 45 MIN: CPT | Performed by: ORTHOPAEDIC SURGERY

## 2018-04-30 NOTE — PROGRESS NOTES
Subjective   Patient ID: Ezio Becerra is a 52 y.o. male  Pain of the Right Knee (Patient says his right knee started hurting a couple months ago, he don't recall any injuries, but thinks he has arthritis.)             History of Present Illness    Several months of soreness in the right knee poorly localized aches feel stiff at times no fall injury clicking locking buckling giving way, no improvement with methotrexate, he does take methotrexate chronically for psoriasis.  Has history of degenerative low back disease psoriasis and other medical issues none orthopedic    Review of Systems   Constitutional: Negative for fever.   HENT: Negative for voice change.    Eyes: Negative for visual disturbance.   Respiratory: Negative for shortness of breath.    Cardiovascular: Negative for chest pain.   Gastrointestinal: Negative for abdominal distention and abdominal pain.   Genitourinary: Negative for dysuria.   Musculoskeletal: Positive for arthralgias. Negative for gait problem and joint swelling.   Skin: Negative for rash.   Neurological: Negative for speech difficulty.   Hematological: Does not bruise/bleed easily.   Psychiatric/Behavioral: Negative for confusion.       Past Medical History:   Diagnosis Date   • DDD (degenerative disc disease), cervical    • DDD (degenerative disc disease), lumbar    • Plantar fasciitis     BILATERAL   • Psoriatic arthritis    • Seasonal allergies         Past Surgical History:   Procedure Laterality Date   • COLONOSCOPY  2012   • COLONOSCOPY N/A 8/11/2017    Procedure: COLONOSCOPY;  Surgeon: Joseph Rucker MD;  Location: [facility] ENDOSCOPY;  Service:    • WISDOM TOOTH EXTRACTION         Family History   Problem Relation Age of Onset   • COPD Father    • Ovarian cancer Sister        Social History     Social History   • Marital status:      Spouse name: N/A   • Number of children: N/A   • Years of education: N/A     Occupational History   • Not on file.     Social History Main  "Topics   • Smoking status: Never Smoker   • Smokeless tobacco: Never Used   • Alcohol use Yes      Comment: OCCASSIONAL   • Drug use: No   • Sexual activity: Defer     Other Topics Concern   • Not on file     Social History Narrative   • No narrative on file       I have reviewed all of the above social hx, family hx, surgical hx, medications, allergies & ROS and confirm that it is accurate.    Allergies   Allergen Reactions   • Septra [Sulfamethoxazole-Trimethoprim]          Current Outpatient Prescriptions:   •  azelastine (ASTELIN) 0.1 % nasal spray, 1 spray into each nostril 2 (Two) Times a Day As Needed for Rhinitis or Allergies. Use in each nostril as directed, Disp: 1 each, Rfl: 1  •  fluticasone (FLONASE) 50 MCG/ACT nasal spray, 2 sprays into each nostril Daily., Disp: 48 g, Rfl: 3  •  folic acid (FOLVITE) 1 MG tablet, Take 1 tablet by mouth Daily., Disp: 100 tablet, Rfl: 3  •  levocetirizine (XYZAL) 5 MG tablet, Take 1 tablet by mouth Every Evening., Disp: 90 tablet, Rfl: 3  •  methotrexate 2.5 MG tablet, Take 6 tablets once a week., Disp: 72 tablet, Rfl: 3  •  naproxen (NAPROSYN) 500 MG tablet, Take 500 mg by mouth 3 (Three) Times a Day As Needed., Disp: , Rfl:   •  pimecrolimus (ELIDEL) 1 % cream, Apply 1 application topically 2 (Two) Times a Day As Needed., Disp: , Rfl:   •  pseudoephedrine (SUDAFED) 120 MG 12 hr tablet, Take 60 mg by mouth Daily., Disp: , Rfl:   •  sildenafil (VIAGRA) 100 MG tablet, Take 100 mg by mouth As Needed., Disp: , Rfl:   •  tacrolimus (PROTOPIC) 0.1 % ointment, Apply 1 application topically Daily As Needed., Disp: , Rfl:   •  valACYclovir (VALTREX) 1000 MG tablet, Take 1 tablet by mouth Daily., Disp: 90 tablet, Rfl: 3    Objective   Resp 18   Ht 180.3 cm (71\")   Wt 88.5 kg (195 lb)   BMI 27.20 kg/m²    Physical Exam  Constitutional: Patient is oriented to person, place, and time. Patient appears well-developed and well-nourished.   HENT:Head: Normocephalic and atraumatic. "   Eyes: EOM are normal. Pupils are equal, round, and reactive to light.   Neck: Normal range of motion. Neck supple.   Cardiovascular: Normal rate.    Pulmonary/Chest: Effort normal and breath sounds normal.   Abdominal: Soft.   Neurological: Patient is alert and oriented to person, place, and time.   Skin: Skin is warm and dry.   Psychiatric: Patient has a normal mood and affect.   Nursing note and vitals reviewed.       Ortho Exam   Right knee with no effusion full extension mild positive patellofemoral crepitus positive patellofemoral apprehension sign, negative Lai sign negative Lockman sign Supple no atrophy around the quadriceps, extensor mechanism intact stable no swelling ecchymosis or bruising.  Doing intact    Assessment/Plan   Review of Radiographic Studies:    Radiographic images today of affected area I personally viewed and showed no sign of acute fracture or dislocation.      Procedures     Ezio was seen today for pain.    Diagnoses and all orders for this visit:    Right knee pain, unspecified chronicity  -     Ambulatory Referral to Physical Therapy Evaluate and treat       Physical therapy referral given      Recommendations/Plan:   Exercise, medications, injections, other patient advice, and return appointment as noted.    Patient agreeable to call or return sooner for any concerns.             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

## 2019-02-01 ENCOUNTER — OFFICE VISIT (OUTPATIENT)
Dept: INTERNAL MEDICINE | Facility: CLINIC | Age: 54
End: 2019-02-01

## 2019-02-01 VITALS
HEART RATE: 79 BPM | BODY MASS INDEX: 28 KG/M2 | WEIGHT: 200 LBS | OXYGEN SATURATION: 98 % | DIASTOLIC BLOOD PRESSURE: 70 MMHG | HEIGHT: 71 IN | SYSTOLIC BLOOD PRESSURE: 114 MMHG | TEMPERATURE: 98 F

## 2019-02-01 DIAGNOSIS — N52.9 MALE ERECTILE DISORDER: ICD-10-CM

## 2019-02-01 DIAGNOSIS — L40.9 PSORIASIS: Primary | ICD-10-CM

## 2019-02-01 PROBLEM — Z12.11 COLON CANCER SCREENING: Status: RESOLVED | Noted: 2017-07-21 | Resolved: 2019-02-01

## 2019-02-01 PROCEDURE — 99396 PREV VISIT EST AGE 40-64: CPT | Performed by: INTERNAL MEDICINE

## 2019-02-01 RX ORDER — NAPROXEN 500 MG/1
500 TABLET ORAL 3 TIMES DAILY PRN
Qty: 90 TABLET | Refills: 3 | Status: SHIPPED | OUTPATIENT
Start: 2019-02-01 | End: 2020-10-29 | Stop reason: SDUPTHER

## 2019-02-01 RX ORDER — LEVOCETIRIZINE DIHYDROCHLORIDE 5 MG/1
5 TABLET, FILM COATED ORAL EVERY EVENING
Qty: 90 TABLET | Refills: 3 | Status: SHIPPED | OUTPATIENT
Start: 2019-02-01 | End: 2019-02-04 | Stop reason: SDUPTHER

## 2019-02-01 RX ORDER — FLUTICASONE PROPIONATE 50 MCG
2 SPRAY, SUSPENSION (ML) NASAL DAILY
Qty: 48 G | Refills: 3 | Status: SHIPPED | OUTPATIENT
Start: 2019-02-01 | End: 2020-06-19 | Stop reason: SDUPTHER

## 2019-02-01 RX ORDER — FLUOCINONIDE TOPICAL SOLUTION USP, 0.05% 0.5 MG/ML
SOLUTION TOPICAL DAILY
Qty: 60 ML | Refills: 3 | Status: SHIPPED | OUTPATIENT
Start: 2019-02-01 | End: 2019-02-04 | Stop reason: SDUPTHER

## 2019-02-01 RX ORDER — TACROLIMUS 1 MG/G
1 OINTMENT TOPICAL DAILY PRN
Qty: 60 G | Refills: 3 | Status: SHIPPED | OUTPATIENT
Start: 2019-02-01 | End: 2020-03-11

## 2019-02-01 RX ORDER — FLUOCINONIDE TOPICAL SOLUTION USP, 0.05% 0.5 MG/ML
SOLUTION TOPICAL 2 TIMES DAILY
COMMUNITY
End: 2019-02-01 | Stop reason: SDUPTHER

## 2019-02-01 NOTE — PROGRESS NOTES
Chief Complaint   Patient presents with   • Annual Exam     plantar fascitis and arthritis in thumb, Discuss sleep - staying alseep        Ezio Becerra is a 53 y.o. male and is here for a comprehensive physical exam. The patient reports problems - lt plantar fasciitis.     History:  Erectile dysfunction  yes  Nocturia  no      Do you take any herbs or supplements that were not prescribed by a doctor? no    Are you taking aspirin daily? no      Health Habits:  Dental Exam. up to date  Eye Exam. unknown  Exercise: 1 times/week.  Current exercise activities include: cardiovascular workout on exercise equipment    Health Maintenance   Topic Date Due   • ZOSTER VACCINE (1 of 2) 09/15/2014   • HEPATITIS C SCREENING  05/27/2016   • ANNUAL PHYSICAL  07/29/2018   • COLONOSCOPY  08/11/2027   • TDAP/TD VACCINES (3 - Td) 09/08/2027   • INFLUENZA VACCINE  Completed       PMH, PSH, SocHx, FamHx, Allergies, and Medications: Reviewed and updated in the Visit Navigator.     Allergies   Allergen Reactions   • Septra [Sulfamethoxazole-Trimethoprim]      Past Medical History:   Diagnosis Date   • DDD (degenerative disc disease), cervical    • DDD (degenerative disc disease), lumbar    • Plantar fasciitis     BILATERAL   • Psoriatic arthritis (CMS/HCC)    • Seasonal allergies      Past Surgical History:   Procedure Laterality Date   • COLONOSCOPY  2012   • COLONOSCOPY N/A 8/11/2017    Procedure: COLONOSCOPY;  Surgeon: Joseph Rucker MD;  Location: [facility] ENDOSCOPY;  Service:    • WISDOM TOOTH EXTRACTION       Social History     Socioeconomic History   • Marital status:      Spouse name: Not on file   • Number of children: Not on file   • Years of education: Not on file   • Highest education level: Not on file   Social Needs   • Financial resource strain: Not on file   • Food insecurity - worry: Not on file   • Food insecurity - inability: Not on file   • Transportation needs - medical: Not on file   • Transportation needs  "- non-medical: Not on file   Occupational History   • Not on file   Tobacco Use   • Smoking status: Never Smoker   • Smokeless tobacco: Never Used   Substance and Sexual Activity   • Alcohol use: Yes     Comment: OCCASSIONAL   • Drug use: No   • Sexual activity: Defer   Other Topics Concern   • Not on file   Social History Narrative   • Not on file     Family History   Problem Relation Age of Onset   • COPD Father    • Ovarian cancer Sister        Review of Systems  Review of Systems   Constitutional: Negative.  Negative for activity change, appetite change, fatigue and fever.   HENT: Negative for congestion, ear discharge, ear pain and trouble swallowing.    Eyes: Negative for photophobia and visual disturbance.   Respiratory: Negative for cough and shortness of breath.    Cardiovascular: Negative for chest pain and palpitations.   Gastrointestinal: Negative for abdominal distention, abdominal pain, constipation, diarrhea, nausea and vomiting.   Endocrine: Negative.    Genitourinary: Negative for dysuria, hematuria and urgency.   Musculoskeletal: Positive for arthralgias. Negative for back pain, joint swelling and myalgias.   Skin: Negative for color change and rash.   Allergic/Immunologic: Negative.    Neurological: Negative for dizziness, weakness, light-headedness and headaches.   Hematological: Negative for adenopathy. Does not bruise/bleed easily.   Psychiatric/Behavioral: Positive for sleep disturbance. Negative for agitation, confusion and dysphoric mood. The patient is not nervous/anxious.        Vitals:    02/01/19 1337   BP: 114/70   Pulse: 79   Temp: 98 °F (36.7 °C)   SpO2: 98%       Objective   /70 Comment: AUTOMATIC  Pulse 79   Temp 98 °F (36.7 °C) (Temporal)   Ht 180.3 cm (71\")   Wt 90.7 kg (200 lb)   SpO2 98%   BMI 27.89 kg/m²     Physical Exam   Constitutional: He is oriented to person, place, and time. He appears well-developed and well-nourished. No distress.   HENT:   Nose: Nose " normal.   Mouth/Throat: Oropharynx is clear and moist.   Eyes: Conjunctivae and EOM are normal. No scleral icterus.   Neck: No tracheal deviation present. No thyromegaly present.   Cardiovascular: Normal rate and regular rhythm. Exam reveals no friction rub.   No murmur heard.  Pulmonary/Chest: No respiratory distress. He has no wheezes. He has no rales.   Abdominal: Soft. He exhibits no distension and no mass. There is no tenderness. There is no guarding.   Musculoskeletal: Normal range of motion. He exhibits no deformity.   Lymphadenopathy:     He has no cervical adenopathy.   Neurological: He is alert and oriented to person, place, and time. He has normal reflexes. No cranial nerve deficit. Coordination normal.   Skin: Skin is warm and dry. No rash noted. No erythema.   Psychiatric: He has a normal mood and affect. His behavior is normal. Judgment and thought content normal.       The 10-year CVD risk score (D'Agostino, et al., 2008) is: 10.2%    Values used to calculate the score:      Age: 53 years      Sex: Male      Diabetic: No      Tobacco smoker: No      Systolic Blood Pressure: 114 mmHg      Is BP treated: No      HDL Cholesterol: 36 mg/dL      Total Cholesterol: 185 mg/dL    Lab Results   Component Value Date    CHOL 185 08/01/2017    TRIG 193 (H) 08/01/2017    HDL 36 (L) 08/01/2017     08/01/2017     Glucose   Date Value Ref Range Status   10/09/2017 73 70 - 100 mg/dL Final     BUN   Date Value Ref Range Status   02/02/2018 22 (H) 7 - 20 mg/dL Final   10/09/2017 16 9 - 23 mg/dL Final     Creatinine   Date Value Ref Range Status   02/02/2018 1.20 0.60 - 1.30 mg/dL Final   10/09/2017 1.00 0.60 - 1.30 mg/dL Final     Sodium   Date Value Ref Range Status   02/02/2018 143 137 - 145 mmol/L Final   10/09/2017 142 132 - 146 mmol/L Final     Potassium   Date Value Ref Range Status   02/02/2018 4.7 3.5 - 5.1 mmol/L Final   10/09/2017 4.1 3.5 - 5.5 mmol/L Final     Chloride   Date Value Ref Range Status    02/02/2018 102 98 - 107 mmol/L Final   10/09/2017 110 (H) 99 - 109 mmol/L Final     CO2   Date Value Ref Range Status   10/09/2017 26.0 20.0 - 31.0 mmol/L Final     Total CO2   Date Value Ref Range Status   02/02/2018 29.0 26.0 - 30.0 mmol/L Final     Calcium   Date Value Ref Range Status   02/02/2018 10.5 (H) 8.4 - 10.2 mg/dL Final   10/09/2017 9.4 8.7 - 10.4 mg/dL Final     Total Protein   Date Value Ref Range Status   10/09/2017 7.0 5.7 - 8.2 g/dL Final     Albumin   Date Value Ref Range Status   02/02/2018 4.40 3.50 - 5.00 g/dL Final   10/09/2017 4.20 3.20 - 4.80 g/dL Final     ALT (SGPT)   Date Value Ref Range Status   02/02/2018 66 13 - 69 U/L Final   10/09/2017 39 7 - 40 U/L Final     AST (SGOT)   Date Value Ref Range Status   02/02/2018 34 15 - 46 U/L Final   10/09/2017 27 0 - 33 U/L Final     Alkaline Phosphatase   Date Value Ref Range Status   02/02/2018 86 38 - 126 U/L Final   10/09/2017 97 25 - 100 U/L Final     Total Bilirubin   Date Value Ref Range Status   02/02/2018 0.4 0.2 - 1.3 mg/dL Final   10/09/2017 0.5 0.3 - 1.2 mg/dL Final     eGFR Non  Am   Date Value Ref Range Status   02/02/2018 63 >60 mL/min/1.73 Final     Comment:     Abnormal estimated GFR should be followed by more specific  studies to confirm end stage chronic renal disease. The  equation used for calculation may not be accurate for  patients less than 19 years old, greater than 70 years old,  patients at extremes of weight, malnutrition, or with acute  renal dysfunction.       eGFR Non  Amer   Date Value Ref Range Status   10/09/2017 78 >60 mL/min/1.73 Final     A/G Ratio   Date Value Ref Range Status   02/02/2018 1.5 1.0 - 2.0 g/dL Final     BUN/Creatinine Ratio   Date Value Ref Range Status   02/02/2018 18.3 6.3 - 21.9 Final   10/09/2017 16.0 7.0 - 25.0 Final     Anion Gap   Date Value Ref Range Status   10/09/2017 6.0 3.0 - 11.0 mmol/L Final     Lab Results   Component Value Date    WBC 8.63 02/02/2018    HGB 15.3  02/02/2018    HCT 44.0 02/02/2018    MCV 95.9 (H) 02/02/2018     02/02/2018       Assessment/Plan   1. Healthy male exam.    2. Patient Counseling: Including but not Limited to the following, when appropriate:  --Nutrition: Stressed importance of moderation in sodium/caffeine intake, saturated fat and cholesterol, caloric balance, sufficient intake of fresh fruits, vegetables, fiber    --Exercise: Stressed the importance of regular exercise.   --Substance Abuse: Discussed cessation/primary prevention of tobacco, alcohol, or other drug use; driving or other dangerous activities under the influence; availability of treatment for abuse, as indicated based on social history.    --Sexuality: Discussed sexually transmitted diseases, partner selection, use of condoms and contraceptive alternatives.   --Injury prevention: Discussed safety belts, safety helmets, smoke detector, smoking near bedding or upholstery.   --Dental health: Discussed importance of regular tooth brushing, flossing, and dental visits.  --Immunizations reviewed.  --Discussed benefits of colon cancer screening.      3. Discussed the patient's BMI with him.  The BMI is in the acceptable range  4. No Follow-up on file.  5. Age-appropriate Screening Scheduled  6. There are no Patient Instructions on file for this visit.    Assessment/Plan     There are no diagnoses linked to this encounter.

## 2019-02-03 RX ORDER — FLUOCINONIDE TOPICAL SOLUTION USP, 0.05% 0.5 MG/ML
SOLUTION TOPICAL DAILY
Qty: 60 ML | Refills: 3 | Status: CANCELLED | OUTPATIENT
Start: 2019-02-03

## 2019-02-03 RX ORDER — LEVOCETIRIZINE DIHYDROCHLORIDE 5 MG/1
5 TABLET, FILM COATED ORAL EVERY EVENING
Qty: 90 TABLET | Refills: 3 | Status: CANCELLED | OUTPATIENT
Start: 2019-02-03

## 2019-02-04 ENCOUNTER — TELEPHONE (OUTPATIENT)
Dept: INTERNAL MEDICINE | Facility: CLINIC | Age: 54
End: 2019-02-04

## 2019-02-04 RX ORDER — FLUOCINONIDE TOPICAL SOLUTION USP, 0.05% 0.5 MG/ML
SOLUTION TOPICAL DAILY
Qty: 60 ML | Refills: 3 | Status: SHIPPED | OUTPATIENT
Start: 2019-02-04 | End: 2019-02-04 | Stop reason: SDUPTHER

## 2019-02-04 RX ORDER — LEVOCETIRIZINE DIHYDROCHLORIDE 5 MG/1
5 TABLET, FILM COATED ORAL EVERY EVENING
Qty: 90 TABLET | Refills: 3 | Status: SHIPPED | OUTPATIENT
Start: 2019-02-04 | End: 2019-02-04 | Stop reason: SDUPTHER

## 2019-02-04 RX ORDER — FLUOCINONIDE TOPICAL SOLUTION USP, 0.05% 0.5 MG/ML
SOLUTION TOPICAL DAILY
Qty: 60 ML | Refills: 3 | Status: SHIPPED | OUTPATIENT
Start: 2019-02-04 | End: 2022-04-01 | Stop reason: SDUPTHER

## 2019-02-04 RX ORDER — LEVOCETIRIZINE DIHYDROCHLORIDE 5 MG/1
5 TABLET, FILM COATED ORAL EVERY EVENING
Qty: 90 TABLET | Refills: 3 | Status: SHIPPED | OUTPATIENT
Start: 2019-02-04 | End: 2020-01-20 | Stop reason: SDUPTHER

## 2019-02-04 NOTE — TELEPHONE ENCOUNTER
Regarding: Prescription Question  ----- Message from Mychart, Generic sent at 2/3/2019  8:31 PM EST -----    Submitted a refill request on the portal for levocetirizine, 90 ea 5 mg tablets, and fluocinonide, 60 ml at [facility] [phone].    An order for both was called in to [facility] on 2/1 but I called to cancel.  [facility] is cheaper for a 90 day supply.  I did use the order for naproxen which was cheaper than [facility].    Thanks,    Sim

## 2019-03-04 RX ORDER — VALACYCLOVIR HYDROCHLORIDE 1 G/1
TABLET, FILM COATED ORAL
Qty: 90 TABLET | Refills: 3 | Status: SHIPPED | OUTPATIENT
Start: 2019-03-04 | End: 2020-03-17 | Stop reason: SDUPTHER

## 2019-04-15 ENCOUNTER — OFFICE VISIT (OUTPATIENT)
Dept: INTERNAL MEDICINE | Facility: CLINIC | Age: 54
End: 2019-04-15

## 2019-04-15 VITALS
OXYGEN SATURATION: 99 % | TEMPERATURE: 98.5 F | SYSTOLIC BLOOD PRESSURE: 132 MMHG | WEIGHT: 201 LBS | DIASTOLIC BLOOD PRESSURE: 74 MMHG | BODY MASS INDEX: 28.14 KG/M2 | HEART RATE: 68 BPM | HEIGHT: 71 IN

## 2019-04-15 DIAGNOSIS — H10.9 BACTERIAL CONJUNCTIVITIS OF BOTH EYES: Primary | ICD-10-CM

## 2019-04-15 DIAGNOSIS — J30.9 ALLERGIC RHINITIS, UNSPECIFIED SEASONALITY, UNSPECIFIED TRIGGER: ICD-10-CM

## 2019-04-15 DIAGNOSIS — B96.89 BACTERIAL CONJUNCTIVITIS OF BOTH EYES: Primary | ICD-10-CM

## 2019-04-15 PROCEDURE — 99213 OFFICE O/P EST LOW 20 MIN: CPT | Performed by: PHYSICIAN ASSISTANT

## 2019-04-15 RX ORDER — NEOMYCIN/POLYMYXIN B/HYDROCORT 3.5-10K-1
1 SUSPENSION, DROPS(FINAL DOSAGE FORM)(ML) OPHTHALMIC (EYE)
Qty: 7.5 ML | Refills: 0 | Status: SHIPPED | OUTPATIENT
Start: 2019-04-15 | End: 2019-04-18 | Stop reason: ALTCHOICE

## 2019-04-15 NOTE — PROGRESS NOTES
Ezio Becerra is a 53 y.o. male.     Subjective   History of Present Illness   Here today with concern of left eye redness, discharge, matting and some blurriness for the last 2 days as well as the same symptoms in the right eye which began yesterday. He has also had some left ear discomfort for the last 2 days. He has had postnasal drip and sinus pressure for the last 2 weeks which seems to be coming and going.  He takes Xyzal daily and uses Nasacort twice daily.  He has also resumed use of Astelin within the last couple of daily. No eye pain, fever, chills, sore throat, body aches, headaches, cough, or SOA.          The following portions of the patient's history were reviewed and updated as appropriate: allergies, current medications, past family history, past medical history, past social history, past surgical history and problem list.    Review of Systems   Constitutional: Negative for appetite change, chills, diaphoresis, fatigue, fever and unexpected weight change.   HENT: Positive for congestion, ear pain, postnasal drip, rhinorrhea and sinus pressure. Negative for drooling, hearing loss, sore throat and trouble swallowing.    Eyes: Positive for discharge, redness, itching and visual disturbance. Negative for photophobia and pain.   Respiratory: Negative for cough, chest tightness, shortness of breath and wheezing.    Cardiovascular: Negative for chest pain, palpitations and leg swelling.   Musculoskeletal: Negative for arthralgias.   Skin: Negative.    Neurological: Negative for dizziness, tremors, speech difficulty, weakness, light-headedness, numbness and headaches.   Psychiatric/Behavioral: Negative for dysphoric mood, self-injury and suicidal ideas. The patient is not nervous/anxious.          Objective    Physical Exam   Constitutional: He is oriented to person, place, and time. He appears well-developed and well-nourished. No distress.   HENT:   Head: Normocephalic and atraumatic.   Nose: Nose  "normal.   Mouth/Throat: No oropharyngeal exudate.   Mild bilateral maxillary sinus tenderness.   Clear postnasal drip.   Moderate bilateral TM effusions.    Eyes: EOM and lids are normal. Pupils are equal, round, and reactive to light. Right eye exhibits discharge (purulent, yellow (trace)). Right eye exhibits no hordeolum. No foreign body present in the right eye. Left eye exhibits discharge (purulent, yellow (moderate)). Left eye exhibits no hordeolum. No foreign body present in the left eye. Right conjunctiva is injected. Right conjunctiva has no hemorrhage. Left conjunctiva is injected. Left conjunctiva has no hemorrhage. No scleral icterus.   Neck: Normal range of motion. Neck supple.   Cardiovascular: Normal rate, regular rhythm and normal heart sounds. Exam reveals no gallop and no friction rub.   No murmur heard.  Pulmonary/Chest: Effort normal and breath sounds normal. No respiratory distress. He has no wheezes. He has no rales. He exhibits no tenderness.   Abdominal: Soft. Bowel sounds are normal. There is no tenderness.   Musculoskeletal: Normal range of motion. He exhibits no edema, tenderness or deformity.   Lymphadenopathy:     He has no cervical adenopathy.   Neurological: He is alert and oriented to person, place, and time. He displays normal reflexes. No cranial nerve deficit or sensory deficit. He exhibits normal muscle tone. Coordination normal.   Skin: Skin is warm and dry. Capillary refill takes less than 2 seconds. No rash noted. He is not diaphoretic.   Psychiatric: He has a normal mood and affect. His behavior is normal. Judgment and thought content normal.   Nursing note and vitals reviewed.        /74   Pulse 68   Temp 98.5 °F (36.9 °C)   Ht 180.3 cm (70.98\")   Wt 91.2 kg (201 lb)   SpO2 99%   BMI 28.05 kg/m²     Nursing note and vitals reviewed.        Assessment/Plan   Ezio was seen today for uri.    Diagnoses and all orders for this visit:    Bacterial conjunctivitis of " both eyes  -     neomycin-polymyxin-hydrocortisone (CORTISPORIN) [id number] ophthalmic suspension; Administer 1 drop to both eyes Every 4 (Four) Hours While Awake for 7 days.    Wipe discharge from the eyes with a clean, wet washcloth several times per day as needed and then wash used cloths in hot water.  Wash hands frequently with antibacterial soap and water to avoid spread.       Allergic rhinitis, unspecified seasonality, unspecified trigger  Continue Xyzal and Flonase daily.       Call or RTC if symptoms worsen or persist.

## 2019-04-18 ENCOUNTER — TELEPHONE (OUTPATIENT)
Dept: INTERNAL MEDICINE | Facility: CLINIC | Age: 54
End: 2019-04-18

## 2019-04-18 RX ORDER — OFLOXACIN 3 MG/ML
1 SOLUTION/ DROPS OPHTHALMIC 4 TIMES DAILY
Qty: 10 ML | Refills: 0 | Status: SHIPPED | OUTPATIENT
Start: 2019-04-18 | End: 2019-04-25

## 2019-04-18 NOTE — TELEPHONE ENCOUNTER
Patient was seen by Aydee on 4/15/19 for pink eye. He states that his eyes haven't gotten any better. He would like to see if he could have a different prescription called in or if he needs to be seen.

## 2019-04-18 NOTE — TELEPHONE ENCOUNTER
I sent in an alternative drop for him to try.  He needs to discontinue the first drop and begin the new one.

## 2020-01-20 RX ORDER — LEVOCETIRIZINE DIHYDROCHLORIDE 5 MG/1
5 TABLET, FILM COATED ORAL EVERY EVENING
Qty: 90 TABLET | Refills: 3 | Status: SHIPPED | OUTPATIENT
Start: 2020-01-20 | End: 2020-10-29 | Stop reason: SDUPTHER

## 2020-03-10 DIAGNOSIS — M25.552 LEFT HIP PAIN: Primary | ICD-10-CM

## 2020-03-11 ENCOUNTER — OFFICE VISIT (OUTPATIENT)
Dept: ORTHOPEDIC SURGERY | Facility: CLINIC | Age: 55
End: 2020-03-11

## 2020-03-11 VITALS — BODY MASS INDEX: 28.56 KG/M2 | WEIGHT: 204 LBS | HEIGHT: 71 IN | RESPIRATION RATE: 18 BRPM

## 2020-03-11 DIAGNOSIS — M76.32 IT BAND SYNDROME, LEFT: ICD-10-CM

## 2020-03-11 DIAGNOSIS — M54.50 LUMBAR PAIN: Primary | ICD-10-CM

## 2020-03-11 DIAGNOSIS — M51.36 DDD (DEGENERATIVE DISC DISEASE), LUMBAR: ICD-10-CM

## 2020-03-11 PROCEDURE — 99213 OFFICE O/P EST LOW 20 MIN: CPT | Performed by: PHYSICIAN ASSISTANT

## 2020-03-11 NOTE — PROGRESS NOTES
Subjective   Patient ID: Ezio Becerra is a 54 y.o.  male  Pain of the Left Hip (Patient here today for left hip pain x sev. Yrs. No known injury. Has done PT. Pain is not improving. Pain is 2/10)         History of Present Illness    Patient presents with complaints of left lateral hip discomfort that has been ongoing for several years.  He did try several visits of formal physical therapy in the past.  He has tried Aleve without significant improvement.  He denies numbness or tingling to the lower extremities.    Pain Score: 2  Pain Location: Hip  Pain Orientation: Left     Pain Descriptors: Dull  Pain Frequency: Constant/continuous  Pain Onset: Ongoing  Date Pain First Started: (several years ago)  Clinical Progression: Gradually worsening  Aggravating Factors: Walking(worse at night, reaching or stretching)        Pain Intervention(s): Home medication(Naprosyn with no relief)  Result of Injury: No       Past Medical History:   Diagnosis Date   • DDD (degenerative disc disease), cervical    • DDD (degenerative disc disease), lumbar    • Plantar fasciitis     BILATERAL   • Psoriatic arthritis (CMS/HCC)    • Seasonal allergies         Past Surgical History:   Procedure Laterality Date   • COLONOSCOPY  2012   • COLONOSCOPY N/A 8/11/2017    Procedure: COLONOSCOPY;  Surgeon: Joseph Rucker MD;  Location: [facility] ENDOSCOPY;  Service:    • WISDOM TOOTH EXTRACTION         Family History   Problem Relation Age of Onset   • COPD Father    • Ovarian cancer Sister        Social History     Socioeconomic History   • Marital status:      Spouse name: Not on file   • Number of children: Not on file   • Years of education: Not on file   • Highest education level: Not on file   Occupational History   • Occupation:      Employer: [employer]   Tobacco Use   • Smoking status: Never Smoker   • Smokeless tobacco: Never Used   Substance and Sexual Activity   • Alcohol use: Yes     Comment: OCCASSIONAL   •  "Drug use: No   • Sexual activity: Defer   Social History Narrative    Right hand dominant         Current Outpatient Medications:   •  fluocinonide (LIDEX) 0.05 % external solution, Apply  topically to the appropriate area as directed Daily., Disp: 60 mL, Rfl: 3  •  fluticasone (FLONASE) 50 MCG/ACT nasal spray, 2 sprays into the nostril(s) as directed by provider Daily., Disp: 48 g, Rfl: 3  •  levocetirizine (XYZAL) 5 MG tablet, Take 1 tablet by mouth Every Evening., Disp: 90 tablet, Rfl: 3  •  naproxen (NAPROSYN) 500 MG tablet, Take 1 tablet by mouth 3 (Three) Times a Day As Needed (PRN)., Disp: 90 tablet, Rfl: 3  •  pimecrolimus (ELIDEL) 1 % cream, Apply 1 application topically 2 (Two) Times a Day As Needed., Disp: , Rfl:   •  pseudoephedrine (SUDAFED) 120 MG 12 hr tablet, Take 60 mg by mouth Daily., Disp: , Rfl:   •  sildenafil (VIAGRA) 100 MG tablet, Take 100 mg by mouth As Needed., Disp: , Rfl:   •  valACYclovir (VALTREX) 1000 MG tablet, TAKE 1 TABLET DAILY, Disp: 90 tablet, Rfl: 3    Allergies   Allergen Reactions   • Septra [Sulfamethoxazole-Trimethoprim]        Review of Systems   Constitutional: Negative for fever.   HENT: Negative for dental problem and voice change.    Eyes: Negative for visual disturbance.   Respiratory: Negative for shortness of breath.    Cardiovascular: Negative for chest pain.   Gastrointestinal: Negative for abdominal pain.   Genitourinary: Negative for dysuria.   Musculoskeletal: Positive for arthralgias and gait problem ( when first getting up from sitting). Negative for joint swelling.   Skin: Negative for rash.   Neurological: Negative for speech difficulty.   Hematological: Does not bruise/bleed easily.   Psychiatric/Behavioral: Negative for confusion.       I have reviewed the medical and surgical history, family history, social history, medications, and/or allergies, and the review of systems of this report.    Objective   Resp 18   Ht 180.3 cm (71\")   Wt 92.5 kg (204 lb)  "  BMI 28.45 kg/m²    Physical Exam   Constitutional: He is oriented to person, place, and time. He appears well-developed and well-nourished.   Eyes: Conjunctivae are normal.   Neck: No tracheal deviation present.   Pulmonary/Chest: Effort normal.   Musculoskeletal:        Lumbar back: He exhibits no bony tenderness.        Legs:  Neurological: He is alert and oriented to person, place, and time.   Psychiatric: He has a normal mood and affect.   Nursing note and vitals reviewed.    Left Hip Exam     Tenderness   The patient is experiencing tenderness in the lateral and greater trochanter.    Range of Motion   Abduction: 45   Flexion: 100     Tests   MARTIN: negative  Jacklyn: positive  Fadir:  Negative FADIR test    Other   Sensation: normal  Pulse: present           Extremity DVT signs are negative on physical exam with negative Valente sign, no calf pain, no palpable cords and no skin tone change   Neurologic Exam     Mental Status   Oriented to person, place, and time.      + ttp at IT band insertion point    Neg SLR      Assessment/Plan   Independent Review of Radiographic Studies:    X-ray of the left hip performed in the office reveals no acute fracture.    X-ray lumbar spine reveals multilevel osteophytes with narrowing of the neural foramina      Procedures       Ezio was seen today for pain.    Diagnoses and all orders for this visit:    Lumbar pain  -     XR Spine Lumbar 2 or 3 View  -     Ambulatory Referral to Physical Therapy Evaluate and treat; Heat, Electrotherapy; Moist heat, Ultrasound; Tens (Home), Iontophoresis; Strengthening    DDD (degenerative disc disease), lumbar  -     Ambulatory Referral to Physical Therapy Evaluate and treat; Heat, Electrotherapy; Moist heat, Ultrasound; Tens (Home), Iontophoresis; Strengthening    It band syndrome, left  -     Ambulatory Referral to Physical Therapy Evaluate and treat; Heat, Electrotherapy; Moist heat, Ultrasound; Tens (Home), Iontophoresis; Strengthening        Physical therapy referral given    Recommendations/Plan:  Exercise, medications, injections, other patient advice, and return appointment as noted.  Patient is encouraged to call or return for any issues or concerns.    Patient agreeable to call or return sooner for any concerns.           EMR Dragon-transcription disclaimer:  This encounter note is an electronic transcription of spoken language to printed text.  Electronic transcription of spoken language may permit erroneous or at times nonsensical words or phrases to be inadvertently transcribed.  Although I have reviewed the note for such errors, some may still exist

## 2020-03-18 RX ORDER — VALACYCLOVIR HYDROCHLORIDE 1 G/1
1000 TABLET, FILM COATED ORAL DAILY
Qty: 90 TABLET | Refills: 3 | Status: SHIPPED | OUTPATIENT
Start: 2020-03-18 | End: 2020-12-04 | Stop reason: SDUPTHER

## 2020-04-30 DIAGNOSIS — M25.552 LEFT HIP PAIN: ICD-10-CM

## 2020-04-30 DIAGNOSIS — M54.50 LUMBAR PAIN: Primary | ICD-10-CM

## 2020-04-30 DIAGNOSIS — M76.32 IT BAND SYNDROME, LEFT: ICD-10-CM

## 2020-06-11 RX ORDER — FLUTICASONE PROPIONATE 50 MCG
2 SPRAY, SUSPENSION (ML) NASAL DAILY
Qty: 48 G | Refills: 3 | OUTPATIENT
Start: 2020-06-11

## 2020-06-19 RX ORDER — FLUTICASONE PROPIONATE 50 MCG
2 SPRAY, SUSPENSION (ML) NASAL DAILY
Qty: 1 BOTTLE | Refills: 0 | Status: SHIPPED | OUTPATIENT
Start: 2020-06-19 | End: 2020-06-22 | Stop reason: SDUPTHER

## 2020-06-22 RX ORDER — FLUTICASONE PROPIONATE 50 MCG
2 SPRAY, SUSPENSION (ML) NASAL DAILY
Qty: 1 BOTTLE | Refills: 11 | Status: SHIPPED | OUTPATIENT
Start: 2020-06-22 | End: 2020-10-29 | Stop reason: SDUPTHER

## 2020-07-15 ENCOUNTER — TELEPHONE (OUTPATIENT)
Dept: ORTHOPEDIC SURGERY | Facility: CLINIC | Age: 55
End: 2020-07-15

## 2020-07-15 NOTE — TELEPHONE ENCOUNTER
Patient sent [employer] message requesting appt on 7/24/20. due to the hip pain. I called him and he said just to leave appt as is.

## 2020-07-20 NOTE — PROGRESS NOTES
"Subjective  Ezio Becerra is a 52 y.o. male    HPI coming in for follow-up patient with psoriasis and pain in his left middle toe thought to be secondary to psoriatic arthritis for which she has been on methotrexate. He continues to use a steroid cream. Complains of nasal congestion and postnasal drainage for which she is on a steroid nose spray. Continues to have a dry cough    The following portions of the patient's history were reviewed and updated as appropriate: allergies, current medications, past family history, past medical history, past social history, past surgical history, and problem list.     Review of Systems   Constitutional: Negative.  Negative for activity change, appetite change, fatigue and fever.   HENT: Negative for congestion, ear discharge, ear pain and trouble swallowing.    Eyes: Negative for photophobia and visual disturbance.   Respiratory: Negative for cough and shortness of breath.    Cardiovascular: Negative for chest pain and palpitations.   Gastrointestinal: Negative for abdominal distention, abdominal pain, constipation, diarrhea, nausea and vomiting.   Endocrine: Negative.    Genitourinary: Negative for dysuria, hematuria and urgency.   Musculoskeletal: Negative for arthralgias, back pain, joint swelling and myalgias.        Lt middle toe pain   Skin: Positive for rash. Negative for color change.   Allergic/Immunologic: Negative.    Neurological: Negative for dizziness, weakness, light-headedness and headaches.   Hematological: Negative for adenopathy. Does not bruise/bleed easily.   Psychiatric/Behavioral: Negative for agitation, confusion and dysphoric mood. The patient is not nervous/anxious.        Visit Vitals   • /81   • Pulse 90   • Temp 98 °F (36.7 °C)   • Ht 71\" (180.3 cm)   • Wt 193 lb (87.5 kg)   • SpO2 98%   • BMI 26.92 kg/m2       Objective  Physical Exam   Constitutional: He is oriented to person, place, and time. He appears well-developed and well-nourished. " Weakness HPI





- General


Chief complaint: Weakness


Stated complaint: Weakness


Time Seen by Provider: 20 21:50


Source: patient, EMS


Mode of arrival: EMS


Limitations: physical limitation





- History of Present Illness


Initial comments: 





Patient is a 72-year-old male with past history of CLL who presents emergency 

Department with reported weakness.  The patient sustained a fall the beginning 

of .  States since then he has suffered from left leg pain.  He has been 

evaluated multiple times in the emergency department, and was recently admitted 

at [facility] last week for 5 days.  Significant imaging of the left 

lower extremity demonstrates no acute findings.  Patient reports to continuing 

to be weak to the point where he can no longer stand from a chair.  He has 

reported weakness in his bilateral lower trauma however the left is worse than 

the right.  Patient reports that tonight he was attempting to stand but he fell 

and hit his left shoulder.  He is right-hand dominant.  States that he cannot 

raise his left shoulder higher than 90.  Denies any numbness or tingling in the

extremity.  Patient did not hit his head.  He has followed with Dr. Carter in

office.  Is scheduled for an MRI of his back in August.  He was also sent to 

rehab however states that this did not help him.  Patient denies any chest pain 

or shortness of breath.  Denies visual changes or speech difficulties.  No 

history of CVA.  No other alleviating, precipitating or modifying factors





- Related Data


                                Home Medications











 Medication  Instructions  Recorded  Confirmed


 


Cetirizine HCl [Zyrtec] 10 mg PO DAILY 17


 


Montelukast Sodium [Singulair] 10 mg PO HS 17


 


lisinopriL [Zestril] 40 mg PO HS 17


 


Insulin NPH Hum/Reg Insulin Hm 17 units SQ QAM 20





[Relion Novolin 70-30 Flexpen]   


 


Omeprazole Magnesium [PriLOSEC OTC] 20 mg PO DAILY PRN 20


 


Ibuprofen [Advil] 800 mg PO Q4H PRN 20








                                  Previous Rx's











 Medication  Instructions  Recorded


 


Fluticasone Nasal Spray [Flonase 2 spr EA NOSTRIL DAILY PRN #1 17





Nasal [place]] bottle 


 


predniSONE 5 mg PO DAILY 13 Days #36 tab 20


 


ALPRAZolam [Xanax] 0.25 mg PO BID PRN #6 tab 20


 


Folic Acid 1 mg PO DAILY@1200  tab 20


 


HYDROcodone/APAP 5-325MG [Norco 1 each PO Q6HR PRN #6 tab 20





5-325]  


 


INSULIN ASPART (NovoLOG) [NovoLOG 0 unit SQ ACHS  vial 20





(formulary)]  


 


Multivitamins, Thera [Multivitamin 1 each PO DAILY@1200  tab 20





(formulary)]  


 


Thiamine [Vitamin B-1] 100 mg PO DAILY@1200  tab 20











                                    Allergies











Allergy/AdvReac Type Severity Reaction Status Date / Time


 


amoxicillin [From Augmentin] Allergy  Rash/Hives Verified 20 23:19


 


clavulanic acid Allergy  Rash/Hives Verified 20 23:19





[From Augmentin]     














Review of Systems


ROS Statement: 


Those systems with pertinent positive or pertinent negative responses have been 

documented in the HPI.





ROS Other: All systems not noted in ROS Statement are negative.





Past Medical History


Past Medical History: Cancer, Diabetes Mellitus, GERD/Reflux, Hypertension, 

Osteoarthritis (OA)


Additional Past Medical History / Comment(s): NIDDM type II, 1999 LEUKEMIA (CLL-

remission), sinus problems, seasonal allergies, tinnitis bilaterally, OA hands, 

R wrist carpal tunnel, past fx with L elbow, R rotator cuff tendon problems, L 

rotator cuff tear, left knee pain


History of Any Multi-Drug Resistant Organisms: None Reported


Past Surgical History: Orthopedic Surgery


Additional Past Surgical History / Comment(s): LEFT HAND thumb tendon repair.


Past Anesthesia/Blood Transfusion Reactions: No Reported Reaction


Past Psychological History: No Psychological Hx Reported


Past Alcohol Use History: None Reported


Past Drug Use History: None Reported





- Past Family History


  ** Father


Family Medical History: CVA/TIA


Additional Family Medical History / Comment(s): Father  at the age of 85yrs.





  ** Mother


Family Medical History: Diabetes Mellitus


Additional Family Medical History / Comment(s): Mother  at the age of 90yrs.





General Exam


Limitations: physical limitation


General appearance: alert, in no apparent distress


Head exam: Present: atraumatic, normocephalic, normal inspection


Eye exam: Present: normal appearance, PERRL, EOMI.  Absent: scleral icterus, 

conjunctival injection, periorbital swelling


ENT exam: Present: normal exam, mucous membranes moist


Neck exam: Present: normal inspection.  Absent: tenderness, meningismus, 

lymphadenopathy


Respiratory exam: Present: normal lung sounds bilaterally.  Absent: respiratory 

distress, wheezes, rales, rhonchi, stridor


Cardiovascular Exam: Present: regular rate, normal rhythm, normal heart sounds. 

Absent: systolic murmur, diastolic murmur, rubs, gallop, clicks


GI/Abdominal exam: Present: soft, normal bowel sounds.  Absent: distended, 

tenderness, guarding, rebound, rigid


Extremities exam: Present: tenderness, normal capillary refill, other 

(tenderness to palpation left shoulder. Unable to flex greater than 90 degrees. 

Intact flexion/extension of elbow/wrist. Normal finger abduction. 2+ pulses all 

4 extremities. 4/5 strength left hip flexor. 5/5 knee extensor, ankle and great 

toe dorsiflexor and foot plantar flexor. 2+ DP and PT pulses. Intact sesation in

all 4 extremities).  Absent: pedal edema, joint swelling, calf tenderness


Back exam: Present: normal inspection


Neurological exam: Present: alert, oriented X3, CN II-XII intact


Psychiatric exam: Present: normal affect, normal mood


Skin exam: Present: warm, dry, intact, normal color.  Absent: rash





Course


                                   Vital Signs











  20





  21:51 23:01 00:31


 


Temperature 98.8 F  98.2 F


 


Pulse Rate 96 78 69


 


Respiratory 16 18 18





Rate   


 


Blood Pressure 177/76 157/76 156/79


 


O2 Sat by Pulse 100 98 98





Oximetry   














EKG Findings





- EKG Comments:


EKG Findings:: EKG demonstrates normal sinus rhythm with ventricular rate of 65.

 TN interval 136.  QRS 80.  QTC of 424.  No acute ST segment elevations or 

depressions concerning for ischemic changes





Medical Decision Making





- Medical Decision Making





Upon arrival patient placed into room 1.  A thorough history and physical exam 

was performed.  Patient does have notable weakness in the left lower extremity. 

Graded as 4/5.  /he also unable to his left shoulder greater than 90.  X-rays 

of left shoulder performed.  I also performed CT of the patient's brain and 

lumbar spine as he has been suffering from persistent left lower extremity 

weakness.  Patient was given 4 mg of morphine for pain control.  Upon 

reevaluation patient continues to have persistent symptoms.  Laboratory studies 

and imaging are negative.  The patient is able to ambulate at home with multiple

falls did feel that he with benefit from inpatient admission for which patient 

did agree to.  I discussed case with Dr. Middleton who accepted admssion.  Patient

is currently awaiting to be transferred to the floor





- Lab Data


Result diagrams: 


                                 20 07:12





                                 20 07:12


                                   Lab Results











  20 Range/Units





  22:36 22:36 22:36 


 


WBC  26.0 H    (3.8-10.6)  k/uL


 


RBC  3.86 L    (4.30-5.90)  m/uL


 


Hgb  12.8 L    (13.0-17.5)  gm/dL


 


Hct  36.0 L    (39.0-53.0)  %


 


MCV  93.3  D    (80.0-100.0)  fL


 


MCH  33.2    (25.0-35.0)  pg


 


MCHC  35.5    (31.0-37.0)  g/dL


 


RDW  13.1    (11.5-15.5)  %


 


Plt Count  105 L    (150-450)  k/uL


 


Neutrophils % (Manual)  22    %


 


Lymphocytes % (Manual)  75    %


 


Monocytes % (Manual)  1    %


 


Eosinophils % (Manual)  2    %


 


Neutrophils # (Manual)  5.72    (1.3-7.7)  k/uL


 


Lymphocytes # (Manual)  19.50 H    (1.0-4.8)  k/uL


 


Monocytes # (Manual)  0.26    (0-1.0)  k/uL


 


Eosinophils # (Manual)  0.52    (0-0.7)  k/uL


 


Nucleated RBCs  0    (0-0)  /100 WBC


 


Manual Slide Review  Performed    


 


Hypochromasia (manual)  Present    


 


PT   11.1   (9.0-12.0)  sec


 


INR   1.1   (<1.2)  


 


APTT   22.2   (22.0-30.0)  sec


 


Sodium    135 L  (137-145)  mmol/L


 


Potassium    4.1  (3.5-5.1)  mmol/L


 


Chloride    102  ()  mmol/L


 


Carbon Dioxide    24  (22-30)  mmol/L


 


Anion Gap    9  mmol/L


 


BUN    15  (9-20)  mg/dL


 


Creatinine    0.68  (0.66-1.25)  mg/dL


 


Est GFR (CKD-EPI)AfAm    >90  (>60 ml/min/1.73 sqM)  


 


Est GFR (CKD-EPI)NonAf    >90  (>60 ml/min/1.73 sqM)  


 


Glucose    170 H  (74-99)  mg/dL


 


Calcium    9.3  (8.4-10.2)  mg/dL


 


Magnesium    1.9  (1.6-2.3)  mg/dL


 


Total Bilirubin    0.8  (0.2-1.3)  mg/dL


 


AST    19  (17-59)  U/L


 


ALT    13  (4-49)  U/L


 


Alkaline Phosphatase    69  ()  U/L


 


Creatine Kinase    50 L  ()  U/L


 


Total Protein    6.0 L  (6.3-8.2)  g/dL


 


Albumin    4.1  (3.5-5.0)  g/dL


 


Coronavirus (PCR)     (Not Detected)  














  20 Range/Units





  00:17 


 


WBC   (3.8-10.6)  k/uL


 


RBC   (4.30-5.90)  m/uL


 


Hgb   (13.0-17.5)  gm/dL


 


Hct   (39.0-53.0)  %


 


MCV   (80.0-100.0)  fL


 


MCH   (25.0-35.0)  pg


 


MCHC   (31.0-37.0)  g/dL


 


RDW   (11.5-15.5)  %


 


Plt Count   (150-450)  k/uL


 


Neutrophils % (Manual)   %


 


Lymphocytes % (Manual)   %


 


Monocytes % (Manual)   %


 


Eosinophils % (Manual)   %


 


Neutrophils # (Manual)   (1.3-7.7)  k/uL


 


Lymphocytes # (Manual)   (1.0-4.8)  k/uL


 


Monocytes # (Manual)   (0-1.0)  k/uL


 


Eosinophils # (Manual)   (0-0.7)  k/uL


 


Nucleated RBCs   (0-0)  /100 WBC


 


Manual Slide Review   


 


Hypochromasia (manual)   


 


PT   (9.0-12.0)  sec


 


INR   (<1.2)  


 


APTT   (22.0-30.0)  sec


 


Sodium   (137-145)  mmol/L


 


Potassium   (3.5-5.1)  mmol/L


 


Chloride   ()  mmol/L


 


Carbon Dioxide   (22-30)  mmol/L


 


Anion Gap   mmol/L


 


BUN   (9-20)  mg/dL


 


Creatinine   (0.66-1.25)  mg/dL


 


Est GFR (CKD-EPI)AfAm   (>60 ml/min/1.73 sqM)  


 


Est GFR (CKD-EPI)NonAf   (>60 ml/min/1.73 sqM)  


 


Glucose   (74-99)  mg/dL


 


Calcium   (8.4-10.2)  mg/dL


 


Magnesium   (1.6-2.3)  mg/dL


 


Total Bilirubin   (0.2-1.3)  mg/dL


 


AST   (17-59)  U/L


 


ALT   (4-49)  U/L


 


Alkaline Phosphatase   ()  U/L


 


Creatine Kinase   ()  U/L


 


Total Protein   (6.3-8.2)  g/dL


 


Albumin   (3.5-5.0)  g/dL


 


Coronavirus (PCR)  Not Detected  (Not Detected)  














Disposition


Clinical Impression: 


 Left leg pain, Unable to ambulate, Fall, Left shoulder pain





Disposition: ADMITTED AS IP TO THIS hospitals


Condition: Stable


Is patient prescribed a controlled substance at d/c from ED?: No


Decision to Admit Reason: Admit from EC


Decision Date: 20


Decision Time: 23:56 No distress.   HENT:   Nose: Nose normal.   Mouth/Throat: Oropharynx is clear and moist.   Eyes: Conjunctivae and EOM are normal. No scleral icterus.   Neck: No tracheal deviation present. No thyromegaly present.   Cardiovascular: Normal rate and regular rhythm.  Exam reveals no friction rub.    No murmur heard.  Pulmonary/Chest: No respiratory distress. He has no wheezes. He has no rales.   Abdominal: Soft. He exhibits no distension and no mass. There is no tenderness. There is no guarding.   Musculoskeletal: Normal range of motion. He exhibits tenderness. He exhibits no deformity.   Lymphadenopathy:     He has no cervical adenopathy.   Neurological: He is alert and oriented to person, place, and time. He has normal reflexes. No cranial nerve deficit. Coordination normal.   Skin: Skin is warm and dry. No rash noted. No erythema.   Psychiatric: He has a normal mood and affect. His behavior is normal. Judgment and thought content normal.       Diagnoses and all orders for this visit:    Cough he has been evaluated by pulmonologist continue with current therapy    Psoriasis does not appear to be severe enough for him to be taking methotrexate will reviewed notes from the rheumatology. Consider stopping this and  using topical steroids    Male erectile disorder sildenafil when necessary

## 2020-07-28 ENCOUNTER — OFFICE VISIT (OUTPATIENT)
Dept: ORTHOPEDIC SURGERY | Facility: CLINIC | Age: 55
End: 2020-07-28

## 2020-07-28 VITALS — BODY MASS INDEX: 28.03 KG/M2 | HEIGHT: 71 IN | WEIGHT: 200.2 LBS | RESPIRATION RATE: 18 BRPM

## 2020-07-28 DIAGNOSIS — M70.62 GREATER TROCHANTERIC BURSITIS OF LEFT HIP: ICD-10-CM

## 2020-07-28 DIAGNOSIS — M76.32 IT BAND SYNDROME, LEFT: ICD-10-CM

## 2020-07-28 DIAGNOSIS — M25.552 LEFT HIP PAIN: Primary | ICD-10-CM

## 2020-07-28 PROCEDURE — 20610 DRAIN/INJ JOINT/BURSA W/O US: CPT | Performed by: PHYSICIAN ASSISTANT

## 2020-07-28 PROCEDURE — 99213 OFFICE O/P EST LOW 20 MIN: CPT | Performed by: PHYSICIAN ASSISTANT

## 2020-07-28 RX ADMIN — METHYLPREDNISOLONE ACETATE 40 MG: 40 INJECTION, SUSPENSION INTRA-ARTICULAR; INTRALESIONAL; INTRAMUSCULAR; SOFT TISSUE at 08:43

## 2020-07-28 RX ADMIN — LIDOCAINE HYDROCHLORIDE 2 ML: 10 INJECTION, SOLUTION INFILTRATION; PERINEURAL at 08:43

## 2020-07-28 NOTE — PROGRESS NOTES
Subjective   Patient ID: Ezio Becerra is a 54 y.o.  male  Follow-up of the Left Hip (Patient here today to follow up left hip pain. He states pain is about the same, pain is 2-3/10. He has noticed improved flexibility and ROM with PT ([facility]). He has not been in about 2 weeks, was going once weekly. He is doing HEP)         History of Present Illness  Patient is following up for a scheduled visit.  He states his left hip pain is still noticeable to the lateral aspect.  There has been no injury or trauma.  He did attend formal physical therapy and is now performing a home exercise program.  He does note improvement with his flexibility to the left hip.  He denies numbness or tingling to the lower extremity.                                                 Past Medical History:   Diagnosis Date   • DDD (degenerative disc disease), cervical    • DDD (degenerative disc disease), lumbar    • Plantar fasciitis     BILATERAL   • Psoriatic arthritis (CMS/HCC)    • Seasonal allergies         Past Surgical History:   Procedure Laterality Date   • COLONOSCOPY  2012   • COLONOSCOPY N/A 8/11/2017    Procedure: COLONOSCOPY;  Surgeon: Joseph Rucker MD;  Location: [facility] ENDOSCOPY;  Service:    • WISDOM TOOTH EXTRACTION         Family History   Problem Relation Age of Onset   • COPD Father    • Ovarian cancer Sister        Social History     Socioeconomic History   • Marital status:      Spouse name: Not on file   • Number of children: Not on file   • Years of education: Not on file   • Highest education level: Not on file   Occupational History   • Occupation:      Employer: [employer]   Tobacco Use   • Smoking status: Never Smoker   • Smokeless tobacco: Never Used   Substance and Sexual Activity   • Alcohol use: Yes     Comment: OCCASSIONAL   • Drug use: No   • Sexual activity: Defer   Social History Narrative    Right hand dominant         Current Outpatient Medications:   •  fluocinonide  "(LIDEX) 0.05 % external solution, Apply  topically to the appropriate area as directed Daily., Disp: 60 mL, Rfl: 3  •  fluticasone (FLONASE) 50 MCG/ACT nasal spray, 2 sprays into the nostril(s) as directed by provider Daily., Disp: 1 bottle, Rfl: 11  •  levocetirizine (XYZAL) 5 MG tablet, Take 1 tablet by mouth Every Evening., Disp: 90 tablet, Rfl: 3  •  naproxen (NAPROSYN) 500 MG tablet, Take 1 tablet by mouth 3 (Three) Times a Day As Needed (PRN)., Disp: 90 tablet, Rfl: 3  •  pimecrolimus (ELIDEL) 1 % cream, Apply 1 application topically 2 (Two) Times a Day As Needed., Disp: , Rfl:   •  pseudoephedrine (SUDAFED) 120 MG 12 hr tablet, Take 60 mg by mouth Daily., Disp: , Rfl:   •  sildenafil (VIAGRA) 100 MG tablet, Take 100 mg by mouth As Needed., Disp: , Rfl:   •  valACYclovir (VALTREX) 1000 MG tablet, Take 1 tablet by mouth Daily., Disp: 90 tablet, Rfl: 3    Allergies   Allergen Reactions   • Septra [Sulfamethoxazole-Trimethoprim]        Review of Systems   Constitutional: Negative for fever.   HENT: Negative for dental problem and voice change.    Eyes: Negative for visual disturbance.   Respiratory: Negative for shortness of breath.    Cardiovascular: Negative for chest pain.   Gastrointestinal: Negative for abdominal pain.   Genitourinary: Negative for dysuria.   Musculoskeletal: Positive for arthralgias. Negative for gait problem and joint swelling.   Skin: Negative for rash.   Neurological: Negative for speech difficulty.   Hematological: Does not bruise/bleed easily.   Psychiatric/Behavioral: Negative for confusion.       I have reviewed the medical and surgical history, family history, social history, medications, and/or allergies, and the review of systems of this report.    Objective   Resp 18   Ht 180.3 cm (71\")   Wt 90.8 kg (200 lb 3.2 oz)   BMI 27.92 kg/m²    Physical Exam   Constitutional: He is oriented to person, place, and time. He appears well-developed.   Pulmonary/Chest: Effort normal. No " respiratory distress.   Musculoskeletal:        Left hip: He exhibits tenderness. He exhibits no crepitus and no deformity.        Lumbar back: He exhibits tenderness.   Neurological: He is alert and oriented to person, place, and time.   Psychiatric: He has a normal mood and affect. His behavior is normal.   Nursing note and vitals reviewed.    Left Hip Exam     Tenderness   The patient is experiencing tenderness in the greater trochanter and lateral.    Range of Motion   Abduction: 40   Flexion: 90   External rotation: 40   Internal rotation: 20     Muscle Strength   The patient has normal left hip strength.     Tests   Jacklyn: positive    Other   Erythema: absent  Sensation: normal            Extremity DVT signs are negative on physical exam with negative Valente sign, no calf pain, no palpable cords and no skin tone change   Neurologic Exam     Mental Status   Oriented to person, place, and time.              Assessment/Plan   Independent Review of Radiographic Studies:    No new imaging done today.  Reviewed imaging with patient at a prior visit.      Large Joint Arthrocentesis: L greater trochanteric bursa  Date/Time: 7/28/2020 8:43 AM  Consent given by: patient  Site marked: site marked  Timeout: Immediately prior to procedure a time out was called to verify the correct patient, procedure, equipment, support staff and site/side marked as required   Supporting Documentation  Indications: pain   Procedure Details  Location: hip - L greater trochanteric bursa  Preparation: Patient was prepped and draped in the usual sterile fashion  Needle size: 22 G  Approach: lateral  Medications administered: 2 mL lidocaine 1 %; 40 mg methylPREDNISolone acetate 40 MG/ML  Patient tolerance: patient tolerated the procedure well with no immediate complications             Ezio was seen today for follow-up.    Diagnoses and all orders for this visit:    Left hip pain  -     Large Joint Arthrocentesis: L greater trochanteric  bursa  -     lidocaine (XYLOCAINE) 1 % injection 2 mL  -     methylPREDNISolone acetate (DEPO-medrol) injection 40 mg    It band syndrome, left    Greater trochanteric bursitis of left hip       Orthopedic activities reviewed and patient expressed appreciation  Discussion of orthopedic goals  Risk, benefits, and merits of treatment alternatives reviewed with the patient and questions answered  Call or notify for any adverse effect from injection therapy    Recommendations/Plan:  Exercise, medications, injections, other patient advice, and return appointment as noted.  Patient is encouraged to call or return for any issues or concerns.      Patient agreeable to call or return sooner for any concerns.             EMR Dragon-transcription disclaimer:  This encounter note is an electronic transcription of spoken language to printed text.  Electronic transcription of spoken language may permit erroneous or at times nonsensical words or phrases to be inadvertently transcribed.  Although I have reviewed the note for such errors, some may still exist

## 2020-07-29 RX ORDER — LIDOCAINE HYDROCHLORIDE 10 MG/ML
2 INJECTION, SOLUTION INFILTRATION; PERINEURAL
Status: COMPLETED | OUTPATIENT
Start: 2020-07-28 | End: 2020-07-28

## 2020-07-29 RX ORDER — METHYLPREDNISOLONE ACETATE 40 MG/ML
40 INJECTION, SUSPENSION INTRA-ARTICULAR; INTRALESIONAL; INTRAMUSCULAR; SOFT TISSUE
Status: COMPLETED | OUTPATIENT
Start: 2020-07-28 | End: 2020-07-28

## 2020-10-29 ENCOUNTER — OFFICE VISIT (OUTPATIENT)
Dept: INTERNAL MEDICINE | Facility: CLINIC | Age: 55
End: 2020-10-29

## 2020-10-29 VITALS
TEMPERATURE: 97.8 F | OXYGEN SATURATION: 98 % | HEIGHT: 71 IN | WEIGHT: 203.8 LBS | SYSTOLIC BLOOD PRESSURE: 110 MMHG | BODY MASS INDEX: 28.53 KG/M2 | DIASTOLIC BLOOD PRESSURE: 70 MMHG | HEART RATE: 61 BPM

## 2020-10-29 DIAGNOSIS — Z00.00 ROUTINE GENERAL MEDICAL EXAMINATION AT A HEALTH CARE FACILITY: Primary | ICD-10-CM

## 2020-10-29 PROCEDURE — 99396 PREV VISIT EST AGE 40-64: CPT | Performed by: INTERNAL MEDICINE

## 2020-10-29 RX ORDER — FLUTICASONE PROPIONATE 50 MCG
2 SPRAY, SUSPENSION (ML) NASAL DAILY
Qty: 1 BOTTLE | Refills: 11 | Status: SHIPPED | OUTPATIENT
Start: 2020-10-29 | End: 2020-11-06 | Stop reason: SDUPTHER

## 2020-10-29 RX ORDER — NAPROXEN 500 MG/1
500 TABLET ORAL 3 TIMES DAILY PRN
Qty: 90 TABLET | Refills: 3 | OUTPATIENT
Start: 2020-10-29 | End: 2022-10-17

## 2020-10-29 RX ORDER — OMEPRAZOLE 20 MG/1
CAPSULE, DELAYED RELEASE ORAL
COMMUNITY
Start: 2020-05-01

## 2020-10-29 RX ORDER — LEVOCETIRIZINE DIHYDROCHLORIDE 5 MG/1
5 TABLET, FILM COATED ORAL EVERY EVENING
Qty: 90 TABLET | Refills: 3 | Status: SHIPPED | OUTPATIENT
Start: 2020-10-29 | End: 2021-12-23

## 2020-10-29 NOTE — PROGRESS NOTES
Chief Complaint   Patient presents with   • Annual Exam     night sweats, acid reflux,        Ezio Becerra is a 55 y.o. male and is here for a comprehensive physical exam. The patient reports problems - gerd.     History:  Erectile dysfunction  yes  Nocturia  no      Do you take any herbs or supplements that were not prescribed by a doctor? no    Are you taking aspirin daily? no      Health Habits:  Dental Exam. up to date  Eye Exam. up to date  Exercise: 3 times/week.  Current exercise activities include: walking    Health Maintenance   Topic Date Due   • ZOSTER VACCINE (1 of 2) 09/15/2014   • HEPATITIS C SCREENING  05/27/2016   • ANNUAL PHYSICAL  10/30/2021   • COLONOSCOPY  08/11/2027   • TDAP/TD VACCINES (4 - Td) 11/14/2029   • INFLUENZA VACCINE  Completed   • Pneumococcal Vaccine 0-64  Aged Out       PMH, PSH, SocHx, FamHx, Allergies, and Medications: Reviewed and updated in the Visit Navigator.     Allergies   Allergen Reactions   • Septra [Sulfamethoxazole-Trimethoprim]      Past Medical History:   Diagnosis Date   • DDD (degenerative disc disease), cervical    • DDD (degenerative disc disease), lumbar    • Plantar fasciitis     BILATERAL   • Psoriatic arthritis (CMS/HCC)    • Seasonal allergies      Past Surgical History:   Procedure Laterality Date   • COLONOSCOPY  2012   • COLONOSCOPY N/A 8/11/2017    Procedure: COLONOSCOPY;  Surgeon: Joseph Rucker MD;  Location: [facility] ENDOSCOPY;  Service:    • WISDOM TOOTH EXTRACTION       Social History     Socioeconomic History   • Marital status:      Spouse name: Not on file   • Number of children: Not on file   • Years of education: Not on file   • Highest education level: Not on file   Occupational History   • Occupation:      Employer: [employer]   Tobacco Use   • Smoking status: Never Smoker   • Smokeless tobacco: Never Used   Substance and Sexual Activity   • Alcohol use: Yes     Comment: OCCASSIONAL   • Drug use: No   • Sexual  "activity: Defer   Social History Narrative    Right hand dominant     Family History   Problem Relation Age of Onset   • COPD Father    • Ovarian cancer Sister        Review of Systems  Review of Systems   Constitutional: Negative.  Negative for activity change, appetite change, fatigue and fever.   HENT: Negative for congestion, ear discharge, ear pain and trouble swallowing.    Eyes: Negative for photophobia and visual disturbance.   Respiratory: Negative for cough and shortness of breath.    Cardiovascular: Negative for chest pain and palpitations.   Gastrointestinal: Negative for abdominal distention, abdominal pain, constipation, diarrhea, nausea and vomiting.   Endocrine: Negative.    Genitourinary: Negative for dysuria, hematuria and urgency.   Musculoskeletal: Positive for arthralgias. Negative for joint swelling and myalgias.   Skin: Negative for color change and rash.   Allergic/Immunologic: Negative.    Neurological: Negative for dizziness, weakness, light-headedness and headaches.   Hematological: Negative for adenopathy. Does not bruise/bleed easily.   Psychiatric/Behavioral: Negative for agitation, confusion and dysphoric mood. The patient is not nervous/anxious.        Vitals:    10/29/20 1515   BP: 110/70   Pulse: 61   Temp: 97.8 °F (36.6 °C)   SpO2: 98%       Objective   /70   Pulse 61   Temp 97.8 °F (36.6 °C) (Temporal)   Ht 180.3 cm (71\")   Wt 92.4 kg (203 lb 12.8 oz)   SpO2 98%   BMI 28.42 kg/m²     Physical Exam  Constitutional:       General: He is not in acute distress.     Appearance: He is well-developed.   HENT:      Nose: Nose normal.   Eyes:      General: No scleral icterus.     Conjunctiva/sclera: Conjunctivae normal.   Neck:      Thyroid: No thyromegaly.      Trachea: No tracheal deviation.   Cardiovascular:      Rate and Rhythm: Normal rate and regular rhythm.      Heart sounds: No murmur. No friction rub.   Pulmonary:      Effort: No respiratory distress.      Breath " sounds: No wheezing or rales.   Abdominal:      General: There is no distension.      Palpations: Abdomen is soft. There is no mass.      Tenderness: There is no abdominal tenderness. There is no guarding.   Musculoskeletal: Normal range of motion.         General: No deformity.   Lymphadenopathy:      Cervical: No cervical adenopathy.   Skin:     General: Skin is warm and dry.      Findings: No erythema or rash.   Neurological:      Mental Status: He is alert and oriented to person, place, and time.      Cranial Nerves: No cranial nerve deficit.      Coordination: Coordination normal.      Deep Tendon Reflexes: Reflexes are normal and symmetric.   Psychiatric:         Behavior: Behavior normal.         Thought Content: Thought content normal.         Judgment: Judgment normal.         The CVD Risk score (D'Agostino, et al., 2008) failed to calculate for the following reasons:    Cannot find a previous HDL lab    Cannot find a previous total cholesterol lab    Lab Results   Component Value Date    CHOL 185 08/01/2017    TRIG 193 (H) 08/01/2017    HDL 36 (L) 08/01/2017     08/01/2017     Glucose   Date Value Ref Range Status   10/09/2017 73 70 - 100 mg/dL Final     BUN   Date Value Ref Range Status   02/02/2018 22 (H) 7 - 20 mg/dL Final   10/09/2017 16 9 - 23 mg/dL Final     Creatinine   Date Value Ref Range Status   02/02/2018 1.20 0.60 - 1.30 mg/dL Final   10/09/2017 1.00 0.60 - 1.30 mg/dL Final     Sodium   Date Value Ref Range Status   02/02/2018 143 137 - 145 mmol/L Final   10/09/2017 142 132 - 146 mmol/L Final     Potassium   Date Value Ref Range Status   02/02/2018 4.7 3.5 - 5.1 mmol/L Final   10/09/2017 4.1 3.5 - 5.5 mmol/L Final     Chloride   Date Value Ref Range Status   02/02/2018 102 98 - 107 mmol/L Final   10/09/2017 110 (H) 99 - 109 mmol/L Final     CO2   Date Value Ref Range Status   10/09/2017 26.0 20.0 - 31.0 mmol/L Final     Total CO2   Date Value Ref Range Status   02/02/2018 29.0 26.0 -  30.0 mmol/L Final     Calcium   Date Value Ref Range Status   02/02/2018 10.5 (H) 8.4 - 10.2 mg/dL Final   10/09/2017 9.4 8.7 - 10.4 mg/dL Final     Total Protein   Date Value Ref Range Status   10/09/2017 7.0 5.7 - 8.2 g/dL Final     Albumin   Date Value Ref Range Status   02/02/2018 4.40 3.50 - 5.00 g/dL Final   10/09/2017 4.20 3.20 - 4.80 g/dL Final     ALT (SGPT)   Date Value Ref Range Status   02/02/2018 66 13 - 69 U/L Final   10/09/2017 39 7 - 40 U/L Final     AST (SGOT)   Date Value Ref Range Status   02/02/2018 34 15 - 46 U/L Final   10/09/2017 27 0 - 33 U/L Final     Alkaline Phosphatase   Date Value Ref Range Status   02/02/2018 86 38 - 126 U/L Final   10/09/2017 97 25 - 100 U/L Final     Total Bilirubin   Date Value Ref Range Status   02/02/2018 0.4 0.2 - 1.3 mg/dL Final   10/09/2017 0.5 0.3 - 1.2 mg/dL Final     eGFR Non  Am   Date Value Ref Range Status   02/02/2018 63 >60 mL/min/1.73 Final     Comment:     Abnormal estimated GFR should be followed by more specific  studies to confirm end stage chronic renal disease. The  equation used for calculation may not be accurate for  patients less than 19 years old, greater than 70 years old,  patients at extremes of weight, malnutrition, or with acute  renal dysfunction.       eGFR Non  Amer   Date Value Ref Range Status   10/09/2017 78 >60 mL/min/1.73 Final     A/G Ratio   Date Value Ref Range Status   02/02/2018 1.5 1.0 - 2.0 g/dL Final     BUN/Creatinine Ratio   Date Value Ref Range Status   02/02/2018 18.3 6.3 - 21.9 Final   10/09/2017 16.0 7.0 - 25.0 Final     Anion Gap   Date Value Ref Range Status   10/09/2017 6.0 3.0 - 11.0 mmol/L Final     Lab Results   Component Value Date    WBC 8.63 02/02/2018    HGB 15.3 02/02/2018    HCT 44.0 02/02/2018    MCV 95.9 (H) 02/02/2018     02/02/2018       Assessment/Plan   1. Healthy male exam.   2. Patient Counseling: Including but not Limited to the following, when appropriate:  --Nutrition:  Stressed importance of moderation in sodium/caffeine intake, saturated fat and cholesterol, caloric balance, sufficient intake of fresh fruits, vegetables, fiber  .--Discussed the issue of daily use of baby aspirin.  --Exercise: Stressed the importance of regular exercise.   --Substance Abuse: Discussed cessation/primary prevention of tobacco, alcohol, or other drug use; driving or other dangerous activities under the influence; availability of treatment for abuse, as indicated based on social history.    --Sexuality: Discussed sexually transmitted diseases, partner selection, use of condoms and contraceptive alternatives.   --Injury prevention: Discussed safety belts, safety helmets, smoke detector, smoking near bedding or upholstery.   --Dental health: Discussed importance of regular tooth brushing, flossing, and dental visits.  --Immunizations reviewed.  --Discussed benefits of colon cancer screening.      3. Discussed the patient's BMI with him.  The BMI is above average; BMI management plan is completed  4. No follow-ups on file.  5. Age-appropriate Screening Scheduled  6. There are no Patient Instructions on file for this visit.    Assessment/Plan     Diagnoses and all orders for this visit:    1. Routine general medical examination at a health care facility (Primary)    Other orders  -     naproxen (Naprosyn) 500 MG tablet; Take 1 tablet by mouth 3 (Three) Times a Day As Needed (PRN).  Dispense: 90 tablet; Refill: 3  -     levocetirizine (XYZAL) 5 MG tablet; Take 1 tablet by mouth Every Evening.  Dispense: 90 tablet; Refill: 3  -     fluticasone (FLONASE) 50 MCG/ACT nasal spray; 2 sprays into the nostril(s) as directed by provider Daily.  Dispense: 1 bottle; Refill: 11

## 2020-10-31 LAB
ALBUMIN SERPL-MCNC: 4.3 G/DL (ref 3.5–5.2)
ALBUMIN/GLOB SERPL: 1.6 G/DL
ALP SERPL-CCNC: 105 U/L (ref 39–117)
ALT SERPL-CCNC: 28 U/L (ref 1–41)
AST SERPL-CCNC: 21 U/L (ref 1–40)
BILIRUB SERPL-MCNC: 0.2 MG/DL (ref 0–1.2)
BUN SERPL-MCNC: 19 MG/DL (ref 6–20)
BUN/CREAT SERPL: 16.8 (ref 7–25)
CALCIUM SERPL-MCNC: 9.9 MG/DL (ref 8.6–10.5)
CHLORIDE SERPL-SCNC: 104 MMOL/L (ref 98–107)
CHOLEST SERPL-MCNC: 203 MG/DL (ref 0–200)
CO2 SERPL-SCNC: 27.4 MMOL/L (ref 22–29)
CREAT SERPL-MCNC: 1.13 MG/DL (ref 0.76–1.27)
GLOBULIN SER CALC-MCNC: 2.7 GM/DL
GLUCOSE SERPL-MCNC: 93 MG/DL (ref 65–99)
HCV AB S/CO SERPL IA: <0.1 S/CO RATIO (ref 0–0.9)
HDLC SERPL-MCNC: 42 MG/DL (ref 40–60)
LDLC SERPL CALC-MCNC: 134 MG/DL (ref 0–100)
POTASSIUM SERPL-SCNC: 5 MMOL/L (ref 3.5–5.2)
PROT SERPL-MCNC: 7 G/DL (ref 6–8.5)
SODIUM SERPL-SCNC: 140 MMOL/L (ref 136–145)
TRIGL SERPL-MCNC: 152 MG/DL (ref 0–150)
VLDLC SERPL CALC-MCNC: 27 MG/DL (ref 5–40)

## 2020-11-06 ENCOUNTER — PATIENT MESSAGE (OUTPATIENT)
Dept: INTERNAL MEDICINE | Facility: CLINIC | Age: 55
End: 2020-11-06

## 2020-11-06 RX ORDER — FLUTICASONE PROPIONATE 50 MCG
2 SPRAY, SUSPENSION (ML) NASAL DAILY
Qty: 3 BOTTLE | Refills: 3 | Status: SHIPPED | OUTPATIENT
Start: 2020-11-06 | End: 2022-04-01 | Stop reason: SDUPTHER

## 2020-11-06 NOTE — TELEPHONE ENCOUNTER
From: Ezio Becerra  To: Ethan Garcia MD  Sent: 11/6/2020 9:52 AM EST  Subject: Prescription Question    Request my refill be revised to [facility] for 90 day supply for:  Fluticasone Propionate, 50 MCG Spray, 2 sparys daily.    Refill was just made following last office visit but only for 30 days (1 bottle).    Request a change to 90 day supply (3 bottles).     Thanks

## 2020-11-11 ENCOUNTER — OFFICE VISIT (OUTPATIENT)
Dept: ORTHOPEDIC SURGERY | Facility: CLINIC | Age: 55
End: 2020-11-11

## 2020-11-11 VITALS — RESPIRATION RATE: 18 BRPM | HEIGHT: 71 IN | BODY MASS INDEX: 28.42 KG/M2 | WEIGHT: 203 LBS

## 2020-11-11 DIAGNOSIS — M25.552 LEFT HIP PAIN: Primary | ICD-10-CM

## 2020-11-11 DIAGNOSIS — M70.62 GREATER TROCHANTERIC BURSITIS OF LEFT HIP: ICD-10-CM

## 2020-11-11 PROCEDURE — 20610 DRAIN/INJ JOINT/BURSA W/O US: CPT | Performed by: PHYSICIAN ASSISTANT

## 2020-11-11 RX ORDER — METHYLPREDNISOLONE ACETATE 40 MG/ML
40 INJECTION, SUSPENSION INTRA-ARTICULAR; INTRALESIONAL; INTRAMUSCULAR; SOFT TISSUE
Status: COMPLETED | OUTPATIENT
Start: 2020-11-11 | End: 2020-11-11

## 2020-11-11 RX ORDER — LIDOCAINE HYDROCHLORIDE 10 MG/ML
2 INJECTION, SOLUTION INFILTRATION; PERINEURAL
Status: COMPLETED | OUTPATIENT
Start: 2020-11-11 | End: 2020-11-11

## 2020-11-11 RX ADMIN — LIDOCAINE HYDROCHLORIDE 2 ML: 10 INJECTION, SOLUTION INFILTRATION; PERINEURAL at 10:41

## 2020-11-11 RX ADMIN — METHYLPREDNISOLONE ACETATE 40 MG: 40 INJECTION, SUSPENSION INTRA-ARTICULAR; INTRALESIONAL; INTRAMUSCULAR; SOFT TISSUE at 10:41

## 2020-11-11 NOTE — PROGRESS NOTES
"Subjective   Ezio Becerra is a 55 y.o. male here today for injection therapy.    Chief Complaint   Patient presents with   • Left Hip - Injections   Patient would like to repeat the left greater trochanter bursa injection     Past Medical History:   Diagnosis Date   • DDD (degenerative disc disease), cervical    • DDD (degenerative disc disease), lumbar    • Plantar fasciitis     BILATERAL   • Psoriatic arthritis (CMS/HCC)    • Seasonal allergies         Past Surgical History:   Procedure Laterality Date   • COLONOSCOPY  2012   • COLONOSCOPY N/A 8/11/2017    Procedure: COLONOSCOPY;  Surgeon: Joseph Rucker MD;  Location: [facility] ENDOSCOPY;  Service:    • WISDOM TOOTH EXTRACTION         Allergies   Allergen Reactions   • Septra [Sulfamethoxazole-Trimethoprim]        Objective   Resp 18   Ht 180.3 cm (71\")   Wt 92.1 kg (203 lb)   BMI 28.31 kg/m²    Physical Exam    Skin exam stable with no erythema, ecchymosis or rash.  No new swelling.  No motor or sensory changes.  Distal pulse intact.    Large Joint Arthrocentesis: L greater trochanteric bursa  Date/Time: 11/11/2020 10:41 AM  Consent given by: patient  Site marked: site marked  Timeout: Immediately prior to procedure a time out was called to verify the correct patient, procedure, equipment, support staff and site/side marked as required   Supporting Documentation  Indications: pain   Procedure Details  Location: hip - L greater trochanteric bursa  Preparation: Patient was prepped and draped in the usual sterile fashion  Needle size: 22 G  Approach: lateral  Medications administered: 2 mL lidocaine 1 %; 40 mg methylPREDNISolone acetate 40 MG/ML  Patient tolerance: patient tolerated the procedure well with no immediate complications          Assessment/Plan      Diagnosis Plan   1. Left hip pain  Large Joint Arthrocentesis: L greater trochanteric bursa   2. Greater trochanteric bursitis of left hip  Large Joint Arthrocentesis: L greater trochanteric bursa "       No complications of injection noted.    Discussion of orthopaedic goals and activities and patient and/or guardian expressed appreciation. Call or notify for any adverse effect from injection therapy. Ice, heat, and/or modalities as beneficial. Watch for signs and symptoms of infection.    Recommendations:  Work/Activity Status: May perform usual activities as tolerated. May return to routine exercise and physical work as tolerated. No strenuous activity.  Patient and/or guardian is encouraged to call or return for any issues or concerns.    Return in about 3 months (around 2/11/2021) for Recheck.  Patient agreeable to call or return sooner for any concerns.

## 2020-12-07 RX ORDER — VALACYCLOVIR HYDROCHLORIDE 1 G/1
1000 TABLET, FILM COATED ORAL DAILY
Qty: 90 TABLET | Refills: 3 | Status: SHIPPED | OUTPATIENT
Start: 2020-12-07 | End: 2021-06-01

## 2021-01-07 ENCOUNTER — TELEPHONE (OUTPATIENT)
Dept: ORTHOPEDIC SURGERY | Facility: CLINIC | Age: 56
End: 2021-01-07

## 2021-01-07 NOTE — TELEPHONE ENCOUNTER
Provider: JERRY ABEBE    Caller:*PATIENT    Relationship to Patient: SELF      Phone Number: [phone]    Reason for Call: PT. STATES THAT HE IS HAVING AN MRI TOMORROW  OF LEFT HIP.   HE IS ASKING IF HE NEEDS TO MAKE AN IN PERSON FOLLOW UP VISIT OR CAN HE DO A PHONE VISIT OR CALL BACK FOR RESULTS.   PLEASE ADVISE.

## 2021-01-08 ENCOUNTER — HOSPITAL ENCOUNTER (OUTPATIENT)
Dept: MRI IMAGING | Facility: HOSPITAL | Age: 56
Discharge: HOME | End: 2021-01-08
Admitting: PHYSICIAN ASSISTANT

## 2021-01-08 ENCOUNTER — APPOINTMENT (OUTPATIENT)
Dept: MRI IMAGING | Facility: HOSPITAL | Age: 56
End: 2021-01-08

## 2021-01-08 DIAGNOSIS — M25.552 LEFT HIP PAIN: ICD-10-CM

## 2021-01-08 DIAGNOSIS — M70.62 GREATER TROCHANTERIC BURSITIS OF LEFT HIP: ICD-10-CM

## 2021-01-08 PROCEDURE — 73721 MRI JNT OF LWR EXTRE W/O DYE: CPT

## 2021-01-11 ENCOUNTER — OFFICE VISIT (OUTPATIENT)
Dept: ORTHOPEDIC SURGERY | Facility: CLINIC | Age: 56
End: 2021-01-11

## 2021-01-11 DIAGNOSIS — M51.36 DDD (DEGENERATIVE DISC DISEASE), LUMBAR: ICD-10-CM

## 2021-01-11 DIAGNOSIS — M25.552 LEFT HIP PAIN: Primary | ICD-10-CM

## 2021-01-11 NOTE — PROGRESS NOTES
Subjective   Ezio Becerra is a 55 y.o. male    Chief Complaint   Patient presents with   • Left Hip - Follow-up     MRI results   This visit has been rescheduled as a phone visit to comply with patient safety concerns in accordance with CDC recommendations. Total time of discussion was 6 minutes.    Patient presents via telephone to review MRI results.  He states he is still experiencing discomfort to the left lateral aspect of his hip.    Past Medical History:   Diagnosis Date   • DDD (degenerative disc disease), cervical    • DDD (degenerative disc disease), lumbar    • Plantar fasciitis     BILATERAL   • Psoriatic arthritis (CMS/HCC)    • Seasonal allergies          Past Surgical History:   Procedure Laterality Date   • COLONOSCOPY  2012   • COLONOSCOPY N/A 8/11/2017    Procedure: COLONOSCOPY;  Surgeon: Joseph Rucker MD;  Location: [facility] ENDOSCOPY;  Service:    • WISDOM TOOTH EXTRACTION         Allergies   Allergen Reactions   • Septra [Sulfamethoxazole-Trimethoprim]        Objective   There were no vitals taken for this visit.   Physical Exam      Procedures  PROCEDURE: MRI HIP LEFT WO CONTRAST-     HISTORY:  Hip pain, chronic, labral tear suspected, xray done     COMPARISON:  None .     TECHNIQUE: Multiplanar multisequence imaging of the hip was performed  without the use of intravenous contrast.     FINDINGS: Bone marrow signal is homogeneous with no evidence of abnormal  edema to suggest a stress reaction or fracture. There is no joint  effusion. There is no evidence of a labral tear. The adductor origins  are normal. There is no evidence of iliopsoas or greater trochanteric  bursitis. The fat plane surrounding the sciatic nerve are preserved. The  soft tissues are unremarkable.     IMPRESSION:  Unremarkable MRI of the hip.     This report was finalized on 1/8/2021 1:22 PM by Dafne Esparza M.D..    From recent office visit:  Study Result    X-ray of the lumbar spine 2 views performed in the  office for the evaluation of lumbar pain. No comparison views are available to review. No acute fracture. + neural foraminal narrowing       Assessment/Plan     No diagnosis found.        Recommendations:    Because the MRI of the left hip is unremarkable I would like for the patient to be evaluated by lumbar subspecialist.  He states he would like to think about a spine specialist and will notify us in the next several days.  Before the patient can be evaluated by a spine subspecialist, he will need MRI of the lumbar spine    Patient agreeable to call or return sooner for any concerns.

## 2021-01-12 DIAGNOSIS — M51.36 DDD (DEGENERATIVE DISC DISEASE), LUMBAR: Primary | ICD-10-CM

## 2021-01-12 DIAGNOSIS — M54.50 LUMBAR PAIN: ICD-10-CM

## 2021-01-29 ENCOUNTER — HOSPITAL ENCOUNTER (OUTPATIENT)
Dept: MRI IMAGING | Facility: HOSPITAL | Age: 56
Discharge: HOME | End: 2021-01-29
Admitting: PHYSICIAN ASSISTANT

## 2021-01-29 DIAGNOSIS — M51.36 DDD (DEGENERATIVE DISC DISEASE), LUMBAR: ICD-10-CM

## 2021-01-29 DIAGNOSIS — M54.50 LUMBAR PAIN: ICD-10-CM

## 2021-01-29 PROCEDURE — 72148 MRI LUMBAR SPINE W/O DYE: CPT

## 2021-02-01 ENCOUNTER — TELEPHONE (OUTPATIENT)
Dept: ORTHOPEDIC SURGERY | Facility: CLINIC | Age: 56
End: 2021-02-01

## 2021-02-01 NOTE — TELEPHONE ENCOUNTER
----- Message from Ezio Becerra sent at 1/30/2021  8:26 PM EST -----  Regarding: Test Results Question  Contact: [phone]  I had an MRI on Fri and have a 2 PM appt with Dr. José Caba in [place].  Please make sure he has a copy of the report by then.  I thought he was part of  network since his office is in [facility] but his office said they were not.     If I need to come by to  a hard copy of the radiologist's report on Monday, please let me know at [phone].  I have a copy of the CD.    Sim

## 2021-06-01 RX ORDER — VALACYCLOVIR HYDROCHLORIDE 1 G/1
TABLET, FILM COATED ORAL
Qty: 90 TABLET | Refills: 3 | Status: SHIPPED | OUTPATIENT
Start: 2021-06-01 | End: 2022-01-17 | Stop reason: SDUPTHER

## 2021-06-01 NOTE — TELEPHONE ENCOUNTER
I spoke with Sim. He is going to call back to schedule his yearly check up     Please advise on refill

## 2021-11-22 PROCEDURE — U0004 COV-19 TEST NON-CDC HGH THRU: HCPCS | Performed by: FAMILY MEDICINE

## 2021-12-23 RX ORDER — LEVOCETIRIZINE DIHYDROCHLORIDE 5 MG/1
TABLET, FILM COATED ORAL
Qty: 30 TABLET | Refills: 0 | Status: SHIPPED | OUTPATIENT
Start: 2021-12-23 | End: 2022-01-03 | Stop reason: SDUPTHER

## 2021-12-23 NOTE — TELEPHONE ENCOUNTER
Rx Refill Note  Requested Prescriptions     Pending Prescriptions Disp Refills   • levocetirizine (XYZAL) 5 MG tablet [Pharmacy Med Name: LEVOCETIRIZI TAB 5MG] 90 tablet 3     Sig: TAKE 1 TABLET EVERY EVENING      Last office visit with prescribing clinician: 10/29/2020      Next office visit with prescribing clinician: Visit date not found            ROSELIA BRUSH MA  12/23/21, 14:50 EST

## 2022-01-03 RX ORDER — LEVOCETIRIZINE DIHYDROCHLORIDE 5 MG/1
5 TABLET, FILM COATED ORAL EVERY EVENING
Qty: 90 TABLET | Refills: 3 | Status: SHIPPED | OUTPATIENT
Start: 2022-01-03 | End: 2022-04-01 | Stop reason: SDUPTHER

## 2022-01-15 ENCOUNTER — PATIENT MESSAGE (OUTPATIENT)
Dept: INTERNAL MEDICINE | Facility: CLINIC | Age: 57
End: 2022-01-15

## 2022-01-17 RX ORDER — VALACYCLOVIR HYDROCHLORIDE 1 G/1
1000 TABLET, FILM COATED ORAL DAILY
Qty: 90 TABLET | Refills: 3 | Status: SHIPPED | OUTPATIENT
Start: 2022-01-17 | End: 2022-04-01 | Stop reason: SDUPTHER

## 2022-04-01 ENCOUNTER — OFFICE VISIT (OUTPATIENT)
Dept: INTERNAL MEDICINE | Facility: CLINIC | Age: 57
End: 2022-04-01

## 2022-04-01 VITALS
HEIGHT: 71 IN | HEART RATE: 73 BPM | BODY MASS INDEX: 27.58 KG/M2 | TEMPERATURE: 97.3 F | OXYGEN SATURATION: 100 % | SYSTOLIC BLOOD PRESSURE: 102 MMHG | WEIGHT: 197 LBS | DIASTOLIC BLOOD PRESSURE: 62 MMHG

## 2022-04-01 DIAGNOSIS — Z00.00 ROUTINE GENERAL MEDICAL EXAMINATION AT A HEALTH CARE FACILITY: Primary | ICD-10-CM

## 2022-04-01 LAB
ALBUMIN SERPL-MCNC: 4.4 G/DL (ref 3.5–5.2)
ALBUMIN/GLOB SERPL: 1.5 G/DL
ALP SERPL-CCNC: 106 U/L (ref 39–117)
ALT SERPL-CCNC: 24 U/L (ref 1–41)
AST SERPL-CCNC: 21 U/L (ref 1–40)
BILIRUB SERPL-MCNC: 0.3 MG/DL (ref 0–1.2)
BUN SERPL-MCNC: 18 MG/DL (ref 6–20)
BUN/CREAT SERPL: 15.3 (ref 7–25)
CALCIUM SERPL-MCNC: 10.1 MG/DL (ref 8.6–10.5)
CHLORIDE SERPL-SCNC: 104 MMOL/L (ref 98–107)
CHOLEST SERPL-MCNC: 200 MG/DL (ref 0–200)
CO2 SERPL-SCNC: 27.3 MMOL/L (ref 22–29)
CREAT SERPL-MCNC: 1.18 MG/DL (ref 0.76–1.27)
EGFRCR SERPLBLD CKD-EPI 2021: 72 ML/MIN/1.73
GLOBULIN SER CALC-MCNC: 2.9 GM/DL
GLUCOSE SERPL-MCNC: 87 MG/DL (ref 65–99)
HDLC SERPL-MCNC: 40 MG/DL (ref 40–60)
LDLC SERPL CALC-MCNC: 137 MG/DL (ref 0–100)
POTASSIUM SERPL-SCNC: 4.8 MMOL/L (ref 3.5–5.2)
PROT SERPL-MCNC: 7.3 G/DL (ref 6–8.5)
SODIUM SERPL-SCNC: 141 MMOL/L (ref 136–145)
TRIGL SERPL-MCNC: 128 MG/DL (ref 0–150)
VLDLC SERPL CALC-MCNC: 23 MG/DL (ref 5–40)

## 2022-04-01 PROCEDURE — 99396 PREV VISIT EST AGE 40-64: CPT | Performed by: INTERNAL MEDICINE

## 2022-04-01 RX ORDER — FLUTICASONE PROPIONATE 50 MCG
2 SPRAY, SUSPENSION (ML) NASAL DAILY
Qty: 18.2 ML | Refills: 3 | Status: SHIPPED | OUTPATIENT
Start: 2022-04-01 | End: 2022-04-01 | Stop reason: SDUPTHER

## 2022-04-01 RX ORDER — FLUTICASONE PROPIONATE 50 MCG
2 SPRAY, SUSPENSION (ML) NASAL DAILY
Qty: 18.2 ML | Refills: 3 | Status: SHIPPED | OUTPATIENT
Start: 2022-04-01 | End: 2022-04-11 | Stop reason: SDUPTHER

## 2022-04-01 RX ORDER — LEVOCETIRIZINE DIHYDROCHLORIDE 5 MG/1
5 TABLET, FILM COATED ORAL EVERY EVENING
Qty: 90 TABLET | Refills: 3 | Status: SHIPPED | OUTPATIENT
Start: 2022-04-01 | End: 2022-04-01 | Stop reason: SDUPTHER

## 2022-04-01 RX ORDER — FLUOCINONIDE TOPICAL SOLUTION USP, 0.05% 0.5 MG/ML
SOLUTION TOPICAL DAILY
Qty: 60 ML | Refills: 3 | Status: SHIPPED | OUTPATIENT
Start: 2022-04-01

## 2022-04-01 RX ORDER — VALACYCLOVIR HYDROCHLORIDE 1 G/1
1000 TABLET, FILM COATED ORAL DAILY
Qty: 90 TABLET | Refills: 3 | Status: SHIPPED | OUTPATIENT
Start: 2022-04-01

## 2022-04-01 RX ORDER — TACROLIMUS 1 MG/G
1 OINTMENT TOPICAL
COMMUNITY
End: 2022-04-01 | Stop reason: SDUPTHER

## 2022-04-01 RX ORDER — LEVOCETIRIZINE DIHYDROCHLORIDE 5 MG/1
5 TABLET, FILM COATED ORAL EVERY EVENING
Qty: 90 TABLET | Refills: 3 | Status: SHIPPED | OUTPATIENT
Start: 2022-04-01

## 2022-04-01 RX ORDER — TACROLIMUS 1 MG/G
OINTMENT TOPICAL
Qty: 100 G | Refills: 1 | Status: SHIPPED | OUTPATIENT
Start: 2022-04-01 | End: 2022-04-01 | Stop reason: SDUPTHER

## 2022-04-01 RX ORDER — FLUOCINONIDE TOPICAL SOLUTION USP, 0.05% 0.5 MG/ML
SOLUTION TOPICAL DAILY
Qty: 60 ML | Refills: 3 | Status: SHIPPED | OUTPATIENT
Start: 2022-04-01 | End: 2022-04-01 | Stop reason: SDUPTHER

## 2022-04-01 RX ORDER — TACROLIMUS 1 MG/G
OINTMENT TOPICAL
Qty: 100 G | Refills: 1 | Status: SHIPPED | OUTPATIENT
Start: 2022-04-01

## 2022-04-01 NOTE — PROGRESS NOTES
Chief Complaint   Patient presents with   • Annual Exam     Sleep concerns - waking up in the middle of the night / trouble getting back to sleep        Ezio Becerra is a 56 y.o. male and is here for a comprehensive physical exam. The patient reports problems - sleep interruptions.     History:  Erectile dysfunction  no  Nocturia  no      Do you take any herbs or supplements that were not prescribed by a doctor? no    Are you taking aspirin daily? no      Health Habits:  Dental Exam. up to date  Eye Exam. up to date  Exercise: 0 times/week.  Current exercise activities include: none    Health Maintenance   Topic Date Due   • ZOSTER VACCINE (1 of 2) Never done   • INFLUENZA VACCINE  08/01/2022   • ANNUAL PHYSICAL  04/02/2023   • COLORECTAL CANCER SCREENING  08/11/2027   • TDAP/TD VACCINES (5 - Td or Tdap) 11/14/2029   • HEPATITIS C SCREENING  Completed   • COVID-19 Vaccine  Completed   • Pneumococcal Vaccine 0-64  Aged Out       PMH, PSH, SocHx, FamHx, Allergies, and Medications: Reviewed and updated in the Visit Navigator.     Allergies   Allergen Reactions   • Septra [Sulfamethoxazole-Trimethoprim]      Past Medical History:   Diagnosis Date   • Allergic 1982    Stopped allergy shots approx 15 yrs ago.  Use Flonase/Xyzol   • DDD (degenerative disc disease), cervical    • DDD (degenerative disc disease), lumbar    • Erectile dysfunction ~2005   • Plantar fasciitis     BILATERAL   • Psoriatic arthritis (HCC)    • Seasonal allergies      Past Surgical History:   Procedure Laterality Date   • COLONOSCOPY  2012   • COLONOSCOPY N/A 8/11/2017    Procedure: COLONOSCOPY;  Surgeon: Joseph Rucker MD;  Location: [facility] ENDOSCOPY;  Service:    • WISDOM TOOTH EXTRACTION       Social History     Socioeconomic History   • Marital status:    Tobacco Use   • Smoking status: Never Smoker   • Smokeless tobacco: Never Used   • Tobacco comment: Probably smoked less than 10 cigars in my life.   Substance and Sexual  "Activity   • Alcohol use: Yes     Comment: about 1 drink per  month   • Drug use: No   • Sexual activity: Yes     Partners: Female     Birth control/protection: Post-menopausal, None     Comment: Wife - hysterectomy     Family History   Problem Relation Age of Onset   • Other Mother         Ulcerative Colitis, diagnosed ~ 75 yrs old   • COPD Father         Smoker   • Ovarian cancer Sister    • Cancer Maternal Grandmother         diagnosed ~ 85 years old   • Cancer Sister         , ovarian cancer, age 50       Review of Systems  Review of Systems   Constitutional: Negative.  Negative for activity change, appetite change, fatigue and fever.   HENT: Negative for congestion, ear discharge, ear pain and trouble swallowing.    Eyes: Negative for photophobia and visual disturbance.   Respiratory: Negative for cough and shortness of breath.    Cardiovascular: Negative for chest pain and palpitations.   Gastrointestinal: Negative for abdominal distention, abdominal pain, constipation, diarrhea, nausea and vomiting.   Endocrine: Negative.    Genitourinary: Negative for dysuria, hematuria and urgency.   Musculoskeletal: Negative for arthralgias, back pain, joint swelling and myalgias.   Skin: Negative for color change and rash.   Allergic/Immunologic: Negative.    Neurological: Negative for dizziness, weakness, light-headedness and headaches.   Hematological: Negative for adenopathy. Does not bruise/bleed easily.   Psychiatric/Behavioral: Positive for sleep disturbance. Negative for agitation, confusion and dysphoric mood. The patient is not nervous/anxious.        Vitals:    22 1026   BP: 102/62   Pulse: 73   Temp: 97.3 °F (36.3 °C)   SpO2: 100%       Objective   /62 Comment: automatic  Pulse 73   Temp 97.3 °F (36.3 °C) (Infrared)   Ht 180.3 cm (71\")   Wt 89.4 kg (197 lb)   SpO2 100%   BMI 27.48 kg/m²     Physical Exam  Constitutional:       General: He is not in acute distress.     Appearance: He is " well-developed.   HENT:      Nose: Nose normal.   Eyes:      General: No scleral icterus.     Conjunctiva/sclera: Conjunctivae normal.   Neck:      Thyroid: No thyromegaly.      Trachea: No tracheal deviation.   Cardiovascular:      Rate and Rhythm: Normal rate and regular rhythm.      Heart sounds: No murmur heard.    No friction rub.   Pulmonary:      Effort: No respiratory distress.      Breath sounds: No wheezing or rales.   Abdominal:      General: There is no distension.      Palpations: Abdomen is soft. There is no mass.      Tenderness: There is no abdominal tenderness. There is no guarding.   Musculoskeletal:         General: No deformity. Normal range of motion.   Lymphadenopathy:      Cervical: No cervical adenopathy.   Skin:     General: Skin is warm and dry.      Findings: No erythema or rash.   Neurological:      Mental Status: He is alert and oriented to person, place, and time.      Cranial Nerves: No cranial nerve deficit.      Coordination: Coordination normal.      Deep Tendon Reflexes: Reflexes are normal and symmetric.   Psychiatric:         Behavior: Behavior normal.         Thought Content: Thought content normal.         Judgment: Judgment normal.         The 10-year CVD risk score (D'Agostino, et al., 2008) is: 9.3%    Values used to calculate the score:      Age: 56 years      Sex: Male      Diabetic: No      Tobacco smoker: No      Systolic Blood Pressure: 102 mmHg      Is BP treated: No      HDL Cholesterol: 42 mg/dL      Total Cholesterol: 203 mg/dL    Lab Results   Component Value Date    CHOL 185 08/01/2017    CHLPL 203 (H) 10/30/2020    TRIG 152 (H) 10/30/2020    HDL 42 10/30/2020     (H) 10/30/2020     Glucose   Date Value Ref Range Status   10/30/2020 93 65 - 99 mg/dL Final   10/09/2017 73 70 - 100 mg/dL Final     BUN   Date Value Ref Range Status   10/30/2020 19 6 - 20 mg/dL Final   10/09/2017 16 9 - 23 mg/dL Final     Creatinine   Date Value Ref Range Status   10/30/2020  1.13 0.76 - 1.27 mg/dL Final   10/09/2017 1.00 0.60 - 1.30 mg/dL Final     Sodium   Date Value Ref Range Status   10/30/2020 140 136 - 145 mmol/L Final   10/09/2017 142 132 - 146 mmol/L Final     Potassium   Date Value Ref Range Status   10/30/2020 5.0 3.5 - 5.2 mmol/L Final   10/09/2017 4.1 3.5 - 5.5 mmol/L Final     Chloride   Date Value Ref Range Status   10/30/2020 104 98 - 107 mmol/L Final   10/09/2017 110 (H) 99 - 109 mmol/L Final     CO2   Date Value Ref Range Status   10/09/2017 26.0 20.0 - 31.0 mmol/L Final     Total CO2   Date Value Ref Range Status   10/30/2020 27.4 22.0 - 29.0 mmol/L Final     Calcium   Date Value Ref Range Status   10/30/2020 9.9 8.6 - 10.5 mg/dL Final   10/09/2017 9.4 8.7 - 10.4 mg/dL Final     Total Protein   Date Value Ref Range Status   10/09/2017 7.0 5.7 - 8.2 g/dL Final     Albumin   Date Value Ref Range Status   10/30/2020 4.30 3.50 - 5.20 g/dL Final   10/09/2017 4.20 3.20 - 4.80 g/dL Final     ALT (SGPT)   Date Value Ref Range Status   10/30/2020 28 1 - 41 U/L Final   10/09/2017 39 7 - 40 U/L Final     AST (SGOT)   Date Value Ref Range Status   10/30/2020 21 1 - 40 U/L Final   10/09/2017 27 0 - 33 U/L Final     Alkaline Phosphatase   Date Value Ref Range Status   10/30/2020 105 39 - 117 U/L Final   10/09/2017 97 25 - 100 U/L Final     Total Bilirubin   Date Value Ref Range Status   10/30/2020 0.2 0.0 - 1.2 mg/dL Final   10/09/2017 0.5 0.3 - 1.2 mg/dL Final     eGFR Non  Am   Date Value Ref Range Status   10/30/2020 67 >60 mL/min/1.73 Final     eGFR Non  Amer   Date Value Ref Range Status   10/09/2017 78 >60 mL/min/1.73 Final     A/G Ratio   Date Value Ref Range Status   10/30/2020 1.6 g/dL Final     BUN/Creatinine Ratio   Date Value Ref Range Status   10/30/2020 16.8 7.0 - 25.0 Final   10/09/2017 16.0 7.0 - 25.0 Final     Anion Gap   Date Value Ref Range Status   10/09/2017 6.0 3.0 - 11.0 mmol/L Final     Lab Results   Component Value Date    WBC 8.63 02/02/2018     HGB 15.3 02/02/2018    HCT 44.0 02/02/2018    MCV 95.9 (H) 02/02/2018     02/02/2018       Assessment/Plan   1. Healthy male exam.   2. Patient Counseling: Including but not Limited to the following, when appropriate:  --Nutrition: Stressed importance of moderation in sodium/caffeine intake, saturated fat and cholesterol, caloric balance, sufficient intake of fresh fruits, vegetables, fiber    --Exercise: Stressed the importance of regular exercise.   --Substance Abuse: Discussed cessation/primary prevention of tobacco, alcohol, or other drug use; driving or other dangerous activities under the influence; availability of treatment for abuse, as indicated based on social history.    --Sexuality: Discussed sexually transmitted diseases, partner selection, use of condoms and contraceptive alternatives.   --Injury prevention: Discussed safety belts, safety helmets, smoke detector, smoking near bedding or upholstery.   --Dental health: Discussed importance of regular tooth brushing, flossing, and dental visits.  --Immunizations reviewed.  --Discussed benefits of colon cancer screening.upto date. Next due 2027      3. Discussed the patient's BMI with him.  The BMI is above average; BMI management plan is completed  4. No follow-ups on file.  5. Age-appropriate Screening Scheduled  6. There are no Patient Instructions on file for this visit.    Assessment/Plan     Diagnoses and all orders for this visit:    1. Routine general medical examination at a health care facility (Primary)  -     Comprehensive Metabolic Panel  -     Lipid Panel    Other orders  -     fluocinonide (LIDEX) 0.05 % external solution; Apply  topically to the appropriate area as directed Daily.  Dispense: 60 mL; Refill: 3  -     fluticasone (FLONASE) 50 MCG/ACT nasal spray; 2 sprays into the nostril(s) as directed by provider Daily.  Dispense: 18.2 mL; Refill: 3  -     levocetirizine (XYZAL) 5 MG tablet; Take 1 tablet by mouth Every Evening.   Dispense: 90 tablet; Refill: 3  -     tacrolimus (PROTOPIC) 0.1 % ointment; 3-4 times a week as needed  Dispense: 100 g; Refill: 1  -     valACYclovir (VALTREX) 1000 MG tablet; Take 1 tablet by mouth Daily.  Dispense: 90 tablet; Refill: 3

## 2022-04-01 NOTE — TELEPHONE ENCOUNTER
Caller: HernanEzio    Relationship: Self    Best call back number: [phone]    Requested Prescriptions:   Requested Prescriptions     Pending Prescriptions Disp Refills   • fluticasone (FLONASE) 50 MCG/ACT nasal spray 18.2 mL 3     Si sprays into the nostril(s) as directed by provider Daily.   • tacrolimus (PROTOPIC) 0.1 % ointment 100 g 1     Sig: 3-4 times a week as needed   • fluocinonide (LIDEX) 0.05 % external solution 60 mL 3     Sig: Apply  topically to the appropriate area as directed Daily.   • levocetirizine (XYZAL) 5 MG tablet 90 tablet 3     Sig: Take 1 tablet by mouth Every Evening.        Pharmacy where request should be sent: [facility] - [place], [place] - [address] AT [address] - [phone]  - [phone]      Additional details provided by patient:   PATIENT WOULD LIKE FOR A 90 DAY SUPPLY OF THE MEDICATION'S TO BE CALLED INTO THE PHARMACY INSTEAD OF A 30 DAY SUPPLY DUE TO A CHEAPER PRICE     Does the patient have less than a 3 day supply:  [x] Yes  [] No    Lele Lebron Rep   22 16:12 EDT

## 2022-04-11 RX ORDER — FLUTICASONE PROPIONATE 50 MCG
2 SPRAY, SUSPENSION (ML) NASAL DAILY
Qty: 18.2 ML | Refills: 12 | Status: SHIPPED | OUTPATIENT
Start: 2022-04-11 | End: 2022-05-13 | Stop reason: SDUPTHER

## 2022-05-13 RX ORDER — FLUTICASONE PROPIONATE 50 MCG
2 SPRAY, SUSPENSION (ML) NASAL DAILY
Qty: 18.2 ML | Refills: 3 | Status: SHIPPED | OUTPATIENT
Start: 2022-05-13 | End: 2022-05-19 | Stop reason: SDUPTHER

## 2022-05-13 RX ORDER — FLUTICASONE PROPIONATE 50 MCG
2 SPRAY, SUSPENSION (ML) NASAL DAILY
Qty: 18.2 ML | Refills: 12 | Status: SHIPPED | OUTPATIENT
Start: 2022-05-13 | End: 2022-05-13 | Stop reason: SDUPTHER

## 2022-05-13 NOTE — TELEPHONE ENCOUNTER
PHONE CALL FROM PATIENT.  MEDICATION CALLED TO PHARMACY BUT HE WOULD LIKE 90 DAY SUPPLY PLEASE.     [facility]    CALL IF NEEDED

## 2022-05-13 NOTE — TELEPHONE ENCOUNTER
Caller: Ezio Becerra    Relationship: Self    Best call back number: [phone] (M)    Requested Prescriptions:   Requested Prescriptions     Pending Prescriptions Disp Refills   • fluticasone (FLONASE) 50 MCG/ACT nasal spray 18.2 mL 12     Si sprays into the nostril(s) as directed by provider Daily.        Pharmacy where request should be sent: [facility] - [place], [place] - [address] AT [address] TO [facility] - [phone] [facility] [phone]      Additional details provided by patient: PATIENT STATED IT HAS TO SAY FLUTICASONE AND NOT THE FLONASE INSURANCE WILL NOT PAY FOR THE NAME BRAND. ALSO PATIENT IS REQUESTING A 90 DAY SUPPLY     Does the patient have less than a 3 day supply:  [] Yes  [x] No    Lele Zuluaga Rep   22 10:43 EDT

## 2022-05-19 RX ORDER — FLUTICASONE PROPIONATE 50 MCG
2 SPRAY, SUSPENSION (ML) NASAL DAILY
Qty: 16 G | Refills: 3 | Status: SHIPPED | OUTPATIENT
Start: 2022-05-19 | End: 2022-05-27 | Stop reason: SDUPTHER

## 2022-05-19 NOTE — TELEPHONE ENCOUNTER
Rx Refill Note  Requested Prescriptions     Pending Prescriptions Disp Refills   • fluticasone (FLONASE) 50 MCG/ACT nasal spray 16 g 3     Si sprays into the nostril(s) as directed by provider Daily.      Last office visit with prescribing clinician: 2022      Next office visit with prescribing clinician: Visit date not found            Shirin Whalen LPN  22, 08:58 EDT

## 2022-05-27 RX ORDER — FLUTICASONE PROPIONATE 50 MCG
2 SPRAY, SUSPENSION (ML) NASAL DAILY
Qty: 48 G | Refills: 3 | Status: SHIPPED | OUTPATIENT
Start: 2022-05-27 | End: 2022-05-31 | Stop reason: SDUPTHER

## 2022-05-27 RX ORDER — FLUTICASONE PROPIONATE 50 MCG
2 SPRAY, SUSPENSION (ML) NASAL DAILY
Qty: 18.2 ML | Refills: 3 | Status: SHIPPED | OUTPATIENT
Start: 2022-05-27 | End: 2022-05-27 | Stop reason: SDUPTHER

## 2022-05-27 RX ORDER — FLUTICASONE PROPIONATE 50 MCG
2 SPRAY, SUSPENSION (ML) NASAL DAILY
Qty: 48 G | Refills: 3 | Status: SHIPPED | OUTPATIENT
Start: 2022-05-27 | End: 2022-05-27 | Stop reason: SDUPTHER

## 2022-05-27 NOTE — TELEPHONE ENCOUNTER
Rx Refill Note  Requested Prescriptions      No prescriptions requested or ordered in this encounter      Last office visit with prescribing clinician: 4/1/2022      Next office visit with prescribing clinician: Visit date not found            ROSELIA BRUSH MA  05/27/22, 09:53 EDT

## 2022-05-31 RX ORDER — FLUTICASONE PROPIONATE 50 MCG
2 SPRAY, SUSPENSION (ML) NASAL DAILY
Qty: 48 G | Refills: 3 | Status: SHIPPED | OUTPATIENT
Start: 2022-05-31

## 2022-05-31 RX ORDER — FLUTICASONE PROPIONATE 50 MCG
2 SPRAY, SUSPENSION (ML) NASAL DAILY
Qty: 48 G | Refills: 3 | Status: SHIPPED | OUTPATIENT
Start: 2022-05-31 | End: 2022-05-31 | Stop reason: SDUPTHER

## 2022-05-31 NOTE — TELEPHONE ENCOUNTER
Rx Refill Note  Requested Prescriptions     Pending Prescriptions Disp Refills   • fluticasone (FLONASE) 50 MCG/ACT nasal spray 48 g 3     Si sprays into the nostril(s) as directed by provider Daily.      Last office visit with prescribing clinician: 2022      Next office visit with prescribing clinician: Visit date not found            Shirin Whalen LPN  22, 08:57 EDT

## 2022-05-31 NOTE — TELEPHONE ENCOUNTER
Rx Refill Note  Requested Prescriptions     Pending Prescriptions Disp Refills   • fluticasone (FLONASE) 50 MCG/ACT nasal spray 48 g 3     Si sprays into the nostril(s) as directed by provider Daily.      Last office visit with prescribing clinician: 2022      Next office visit with prescribing clinician: Visit date not found            Shirin Whalen LPN  22, 08:58 EDT

## 2022-07-15 ENCOUNTER — TELEPHONE (OUTPATIENT)
Dept: SURGERY | Facility: CLINIC | Age: 57
End: 2022-07-15

## 2022-07-15 NOTE — TELEPHONE ENCOUNTER
Patient called and has some questions about scheduling a colonoscopy.  Please call at [phone].

## 2022-07-19 ENCOUNTER — TELEPHONE (OUTPATIENT)
Dept: SURGERY | Facility: CLINIC | Age: 57
End: 2022-07-19

## 2022-07-19 NOTE — TELEPHONE ENCOUNTER
Pt wants to know if he can wait 10 yrs since his last colonoscopy which would be 2027.  He had tubular adenoma in 2007 but the last 2 colonoscopies have been negative.  He asked if he needs a flex sig or Cologard?

## 2022-10-17 PROCEDURE — U0004 COV-19 TEST NON-CDC HGH THRU: HCPCS | Performed by: PHYSICIAN ASSISTANT

## 2023-04-14 ENCOUNTER — OFFICE VISIT (OUTPATIENT)
Dept: INTERNAL MEDICINE | Facility: CLINIC | Age: 58
End: 2023-04-14
Payer: COMMERCIAL

## 2023-04-14 VITALS
DIASTOLIC BLOOD PRESSURE: 64 MMHG | HEART RATE: 73 BPM | HEIGHT: 71 IN | TEMPERATURE: 97.1 F | SYSTOLIC BLOOD PRESSURE: 116 MMHG | WEIGHT: 198 LBS | BODY MASS INDEX: 27.72 KG/M2 | OXYGEN SATURATION: 93 %

## 2023-04-14 DIAGNOSIS — Z00.00 ROUTINE GENERAL MEDICAL EXAMINATION AT A HEALTH CARE FACILITY: Primary | ICD-10-CM

## 2023-04-14 LAB
ALBUMIN SERPL-MCNC: 4.5 G/DL (ref 3.5–5.2)
ALBUMIN/GLOB SERPL: 1.7 G/DL
ALP SERPL-CCNC: 96 U/L (ref 39–117)
ALT SERPL-CCNC: 21 U/L (ref 1–41)
AST SERPL-CCNC: 17 U/L (ref 1–40)
BILIRUB SERPL-MCNC: <0.2 MG/DL (ref 0–1.2)
BUN SERPL-MCNC: 19 MG/DL (ref 6–20)
BUN/CREAT SERPL: 14 (ref 7–25)
CALCIUM SERPL-MCNC: 10.4 MG/DL (ref 8.6–10.5)
CHLORIDE SERPL-SCNC: 106 MMOL/L (ref 98–107)
CHOLEST SERPL-MCNC: 172 MG/DL (ref 0–200)
CO2 SERPL-SCNC: 27.3 MMOL/L (ref 22–29)
CREAT SERPL-MCNC: 1.36 MG/DL (ref 0.76–1.27)
EGFRCR SERPLBLD CKD-EPI 2021: 60.3 ML/MIN/1.73
GLOBULIN SER CALC-MCNC: 2.7 GM/DL
GLUCOSE SERPL-MCNC: 101 MG/DL (ref 65–99)
HDLC SERPL-MCNC: 39 MG/DL (ref 40–60)
LDLC SERPL CALC-MCNC: 114 MG/DL (ref 0–100)
POTASSIUM SERPL-SCNC: 5.3 MMOL/L (ref 3.5–5.2)
PROT SERPL-MCNC: 7.2 G/DL (ref 6–8.5)
SODIUM SERPL-SCNC: 140 MMOL/L (ref 136–145)
TRIGL SERPL-MCNC: 104 MG/DL (ref 0–150)
VLDLC SERPL CALC-MCNC: 19 MG/DL (ref 5–40)

## 2023-04-14 PROCEDURE — 99396 PREV VISIT EST AGE 40-64: CPT | Performed by: INTERNAL MEDICINE

## 2023-04-14 RX ORDER — TACROLIMUS 1 MG/G
OINTMENT TOPICAL
Qty: 100 G | Refills: 1 | Status: SHIPPED | OUTPATIENT
Start: 2023-04-14

## 2023-04-14 RX ORDER — VALACYCLOVIR HYDROCHLORIDE 1 G/1
1000 TABLET, FILM COATED ORAL DAILY
Qty: 90 TABLET | Refills: 3 | Status: SHIPPED | OUTPATIENT
Start: 2023-04-14

## 2023-04-14 RX ORDER — FLUTICASONE PROPIONATE 50 MCG
2 SPRAY, SUSPENSION (ML) NASAL DAILY
Qty: 48 G | Refills: 3 | Status: SHIPPED | OUTPATIENT
Start: 2023-04-14

## 2023-04-14 NOTE — PROGRESS NOTES
Chief Complaint   Patient presents with   • Annual Exam       Ezio Becerra is a 57 y.o. male and is here for a comprehensive physical exam. The patient reports problems - chronic rhinitis.     History:  Erectile dysfunction  no  Nocturia  yes      Do you take any herbs or supplements that were not prescribed by a doctor? no    Are you taking aspirin daily? no      Health Habits:  Dental Exam. up to date  Eye Exam. up to date  Exercise: 1 times/week.  Current exercise activities include: walking    Health Maintenance   Topic Date Due   • ZOSTER VACCINE (1 of 2) 04/14/2023 (Originally 9/15/2014)   • INFLUENZA VACCINE  08/01/2023   • ANNUAL PHYSICAL  04/14/2024   • COLORECTAL CANCER SCREENING  08/11/2027   • TDAP/TD VACCINES (5 - Td or Tdap) 11/14/2029   • HEPATITIS C SCREENING  Completed   • COVID-19 Vaccine  Completed   • Pneumococcal Vaccine 0-64  Aged Out       PMH, PSH, SocHx, FamHx, Allergies, and Medications: Reviewed and updated in the Visit Navigator.     Allergies   Allergen Reactions   • Septra [Sulfamethoxazole-Trimethoprim]      Past Medical History:   Diagnosis Date   • Allergic 1982    Stopped allergy shots approx 15 yrs ago.  Use Flonase/Xyzol   • DDD (degenerative disc disease), cervical    • DDD (degenerative disc disease), lumbar    • Erectile dysfunction ~2005   • Plantar fasciitis     BILATERAL   • Psoriatic arthritis    • Seasonal allergies      Past Surgical History:   Procedure Laterality Date   • COLONOSCOPY  2012   • COLONOSCOPY N/A 8/11/2017    Procedure: COLONOSCOPY;  Surgeon: Joseph Rucker MD;  Location: [facility] ENDOSCOPY;  Service:    • WISDOM TOOTH EXTRACTION       Social History     Socioeconomic History   • Marital status:    Tobacco Use   • Smoking status: Never   • Smokeless tobacco: Never   • Tobacco comments:     Probably smoked less than 10 cigars in my life.   Vaping Use   • Vaping Use: Never used   Substance and Sexual Activity   • Alcohol use: Yes     Comment:  "about 1 drink per  month   • Drug use: No   • Sexual activity: Yes     Partners: Female     Birth control/protection: Post-menopausal, None     Comment: Wife - hysterectomy     Family History   Problem Relation Age of Onset   • Other Mother         Ulcerative Colitis, diagnosed ~ 75 yrs old   • COPD Father         Smoker   • Ovarian cancer Sister    • Cancer Maternal Grandmother         diagnosed ~ 85 years old   • Cancer Sister         , ovarian cancer, age 50       Review of Systems  Review of Systems   Constitutional: Negative.  Negative for activity change, appetite change, fatigue and fever.   HENT: Negative for congestion, ear discharge, ear pain and trouble swallowing.    Eyes: Negative for photophobia and visual disturbance.   Respiratory: Negative for cough and shortness of breath.    Cardiovascular: Negative for chest pain and palpitations.   Gastrointestinal: Negative for abdominal distention, abdominal pain, constipation, diarrhea, nausea and vomiting.   Endocrine: Negative.    Genitourinary: Negative for dysuria, hematuria and urgency.   Musculoskeletal: Positive for arthralgias. Negative for back pain, joint swelling and myalgias.   Skin: Negative for color change and rash.   Allergic/Immunologic: Negative.    Neurological: Negative for dizziness, weakness, light-headedness and headaches.   Hematological: Negative for adenopathy. Does not bruise/bleed easily.   Psychiatric/Behavioral: Positive for sleep disturbance. Negative for agitation, confusion and dysphoric mood. The patient is not nervous/anxious.        Vitals:    23 0900   BP: 116/64   Pulse: 73   Temp: 97.1 °F (36.2 °C)   SpO2: 93%       Objective   /64   Pulse 73   Temp 97.1 °F (36.2 °C)   Ht 180.3 cm (71\")   Wt 89.8 kg (198 lb)   SpO2 93%   BMI 27.62 kg/m²     Physical Exam  Constitutional:       General: He is not in acute distress.     Appearance: He is well-developed.   HENT:      Nose: Nose normal.   Eyes:      " General: No scleral icterus.     Conjunctiva/sclera: Conjunctivae normal.   Neck:      Thyroid: No thyromegaly.      Trachea: No tracheal deviation.   Cardiovascular:      Rate and Rhythm: Normal rate and regular rhythm.      Heart sounds: No murmur heard.    No friction rub.   Pulmonary:      Effort: No respiratory distress.      Breath sounds: No wheezing or rales.   Abdominal:      General: There is no distension.      Palpations: Abdomen is soft. There is no mass.      Tenderness: There is no abdominal tenderness. There is no guarding.   Musculoskeletal:         General: Deformity present. Normal range of motion.   Lymphadenopathy:      Cervical: No cervical adenopathy.   Skin:     General: Skin is warm and dry.      Findings: No erythema or rash.   Neurological:      Mental Status: He is alert and oriented to person, place, and time.      Cranial Nerves: No cranial nerve deficit.      Coordination: Coordination normal.      Deep Tendon Reflexes: Reflexes are normal and symmetric.   Psychiatric:         Behavior: Behavior normal.         Thought Content: Thought content normal.         Judgment: Judgment normal.         The 10-year CVD risk score (D'Agostino, et al., 2008) is: 12.8%    Values used to calculate the score:      Age: 57 years      Sex: Male      Diabetic: No      Tobacco smoker: No      Systolic Blood Pressure: 116 mmHg      Is BP treated: No      HDL Cholesterol: 40 mg/dL      Total Cholesterol: 200 mg/dL    Lab Results   Component Value Date    CHOL 185 08/01/2017    CHLPL 200 04/01/2022    TRIG 128 04/01/2022    HDL 40 04/01/2022     (H) 04/01/2022     Glucose   Date Value Ref Range Status   04/01/2022 87 65 - 99 mg/dL Final   10/09/2017 73 70 - 100 mg/dL Final     BUN   Date Value Ref Range Status   04/01/2022 18 6 - 20 mg/dL Final   10/09/2017 16 9 - 23 mg/dL Final     Creatinine   Date Value Ref Range Status   04/01/2022 1.18 0.76 - 1.27 mg/dL Final   10/09/2017 1.00 0.60 - 1.30 mg/dL  Final     Sodium   Date Value Ref Range Status   04/01/2022 141 136 - 145 mmol/L Final   10/09/2017 142 132 - 146 mmol/L Final     Potassium   Date Value Ref Range Status   04/01/2022 4.8 3.5 - 5.2 mmol/L Final   10/09/2017 4.1 3.5 - 5.5 mmol/L Final     Chloride   Date Value Ref Range Status   04/01/2022 104 98 - 107 mmol/L Final   10/09/2017 110 (H) 99 - 109 mmol/L Final     CO2   Date Value Ref Range Status   10/09/2017 26.0 20.0 - 31.0 mmol/L Final     Total CO2   Date Value Ref Range Status   04/01/2022 27.3 22.0 - 29.0 mmol/L Final     Calcium   Date Value Ref Range Status   04/01/2022 10.1 8.6 - 10.5 mg/dL Final   10/09/2017 9.4 8.7 - 10.4 mg/dL Final     Total Protein   Date Value Ref Range Status   10/09/2017 7.0 5.7 - 8.2 g/dL Final     Albumin   Date Value Ref Range Status   04/01/2022 4.40 3.50 - 5.20 g/dL Final   10/09/2017 4.20 3.20 - 4.80 g/dL Final     ALT (SGPT)   Date Value Ref Range Status   04/01/2022 24 1 - 41 U/L Final   10/09/2017 39 7 - 40 U/L Final     AST (SGOT)   Date Value Ref Range Status   04/01/2022 21 1 - 40 U/L Final   10/09/2017 27 0 - 33 U/L Final     Alkaline Phosphatase   Date Value Ref Range Status   04/01/2022 106 39 - 117 U/L Final   10/09/2017 97 25 - 100 U/L Final     Total Bilirubin   Date Value Ref Range Status   04/01/2022 0.3 0.0 - 1.2 mg/dL Final   10/09/2017 0.5 0.3 - 1.2 mg/dL Final     eGFR Non  Am   Date Value Ref Range Status   10/30/2020 67 >60 mL/min/1.73 Final     eGFR Non  Amer   Date Value Ref Range Status   10/09/2017 78 >60 mL/min/1.73 Final     A/G Ratio   Date Value Ref Range Status   04/01/2022 1.5 g/dL Final     BUN/Creatinine Ratio   Date Value Ref Range Status   04/01/2022 15.3 7.0 - 25.0 Final   10/09/2017 16.0 7.0 - 25.0 Final     Anion Gap   Date Value Ref Range Status   10/09/2017 6.0 3.0 - 11.0 mmol/L Final     Lab Results   Component Value Date    WBC 8.63 02/02/2018    HGB 15.3 02/02/2018    HCT 44.0 02/02/2018    MCV 95.9 (H)  02/02/2018     02/02/2018       Assessment & Plan   1. Healthy male exam.   2. Patient Counseling: Including but not Limited to the following, when appropriate:  --Nutrition: Stressed importance of moderation in sodium/caffeine intake, saturated fat and cholesterol, caloric balance, sufficient intake of fresh fruits, vegetables, fiber    --Exercise: Stressed the importance of regular exercise.   --Substance Abuse: Discussed cessation/primary prevention of tobacco, alcohol, or other drug use; driving or other dangerous activities under the influence; availability of treatment for abuse, as indicated based on social history.    --Sexuality: Discussed sexually transmitted diseases, partner selection, use of condoms and contraceptive alternatives.   --Injury prevention: Discussed safety belts, safety helmets, smoke detector, smoking near bedding or upholstery.   --Dental health: Discussed importance of regular tooth brushing, flossing, and dental visits.  --Immunizations reviewed.  --Discussed benefits of colon cancer screening.      3. Discussed the patient's BMI with him.  The BMI is in the acceptable range  4. No follow-ups on file.  5. Age-appropriate Screening Scheduled  6. There are no Patient Instructions on file for this visit.    Assessment & Plan     Diagnoses and all orders for this visit:    1. Routine general medical examination at a health care facility (Primary)

## 2023-04-18 DIAGNOSIS — Z00.00 ROUTINE GENERAL MEDICAL EXAMINATION AT A HEALTH CARE FACILITY: Primary | ICD-10-CM

## 2023-05-13 LAB
BUN SERPL-MCNC: 16 MG/DL (ref 6–20)
BUN/CREAT SERPL: 15.7 (ref 7–25)
CALCIUM SERPL-MCNC: 10.3 MG/DL (ref 8.6–10.5)
CHLORIDE SERPL-SCNC: 103 MMOL/L (ref 98–107)
CO2 SERPL-SCNC: 27.6 MMOL/L (ref 22–29)
CREAT SERPL-MCNC: 1.02 MG/DL (ref 0.76–1.27)
EGFRCR SERPLBLD CKD-EPI 2021: 85.2 ML/MIN/1.73
GLUCOSE SERPL-MCNC: 90 MG/DL (ref 65–99)
POTASSIUM SERPL-SCNC: 4.9 MMOL/L (ref 3.5–5.2)
SODIUM SERPL-SCNC: 140 MMOL/L (ref 136–145)

## 2023-09-21 ENCOUNTER — OFFICE VISIT (OUTPATIENT)
Dept: INTERNAL MEDICINE | Facility: CLINIC | Age: 58
End: 2023-09-21
Payer: COMMERCIAL

## 2023-09-21 VITALS
WEIGHT: 200 LBS | OXYGEN SATURATION: 99 % | DIASTOLIC BLOOD PRESSURE: 66 MMHG | SYSTOLIC BLOOD PRESSURE: 106 MMHG | BODY MASS INDEX: 28 KG/M2 | TEMPERATURE: 97.8 F | HEIGHT: 71 IN | HEART RATE: 64 BPM

## 2023-09-21 DIAGNOSIS — H93.A9 PULSATILE TINNITUS: Primary | ICD-10-CM

## 2023-09-21 PROCEDURE — 99213 OFFICE O/P EST LOW 20 MIN: CPT | Performed by: INTERNAL MEDICINE

## 2023-09-21 NOTE — PROGRESS NOTES
"Subjective  Ezio Becerra is a 58 y.o. male    HPI says he can hear his heartbeat especially after activity or while trying to sleep has been occurring for the past few weeks has checked his blood pressure which is unremarkable symptoms are worse after exercise.  Denies headache or visual disturbances no earache denies any ringing in his ears or hearing loss    The following portions of the patient's history were reviewed and updated as appropriate: allergies, current medications, past family history, past medical history, past social history, past surgical history, and problem list.     Review of Systems   Constitutional: Negative.  Negative for activity change, appetite change, fatigue and fever.   HENT:  Positive for ear pain. Negative for congestion, ear discharge, hearing loss, rhinorrhea, sore throat and trouble swallowing.    Eyes:  Negative for photophobia and visual disturbance.   Respiratory:  Negative for cough and shortness of breath.    Cardiovascular:  Negative for chest pain and palpitations.   Gastrointestinal:  Negative for abdominal distention, abdominal pain, constipation, diarrhea, nausea and vomiting.   Endocrine: Negative.    Genitourinary:  Negative for dysuria, hematuria and urgency.   Musculoskeletal:  Negative for arthralgias, back pain, joint swelling, myalgias and neck pain.   Skin:  Negative for color change and rash.   Allergic/Immunologic: Negative.    Neurological:  Positive for headaches. Negative for dizziness, weakness and light-headedness.   Hematological:  Negative for adenopathy. Does not bruise/bleed easily.   Psychiatric/Behavioral:  Negative for agitation, confusion and dysphoric mood. The patient is not nervous/anxious.      Visit Vitals  /66   Pulse 64   Temp 97.8 °F (36.6 °C)   Ht 180.3 cm (70.98\")   Wt 90.7 kg (200 lb)   SpO2 99%   BMI 27.91 kg/m²       Objective  Physical Exam  Constitutional:       General: He is not in acute distress.     Appearance: He is " well-developed.   HENT:      Nose: Nose normal.   Eyes:      General: No scleral icterus.     Conjunctiva/sclera: Conjunctivae normal.   Neck:      Thyroid: No thyromegaly.      Trachea: No tracheal deviation.   Cardiovascular:      Rate and Rhythm: Normal rate and regular rhythm.      Heart sounds: No murmur heard.    No friction rub.   Pulmonary:      Effort: No respiratory distress.      Breath sounds: No wheezing or rales.   Abdominal:      General: There is no distension.      Palpations: Abdomen is soft. There is no mass.      Tenderness: There is no abdominal tenderness. There is no guarding.   Musculoskeletal:         General: Deformity present. Normal range of motion.   Lymphadenopathy:      Cervical: No cervical adenopathy.   Skin:     General: Skin is warm and dry.      Findings: No erythema or rash.   Neurological:      Mental Status: He is alert and oriented to person, place, and time.      Cranial Nerves: No cranial nerve deficit.      Coordination: Coordination normal.      Deep Tendon Reflexes: Reflexes are normal and symmetric.   Psychiatric:         Behavior: Behavior normal.         Thought Content: Thought content normal.         Judgment: Judgment normal.       Diagnoses and all orders for this visit:    Pulsatile tinnitus ear exam okay follow BP readings at home is not causing significant distress.  Patient reassured            Answers submitted by the patient for this visit:  Primary Reason for Visit (Submitted on 9/16/2023)  What is the primary reason for your visit?: Ear Pain

## 2023-12-22 ENCOUNTER — TRANSCRIBE ORDERS (OUTPATIENT)
Dept: PHYSICAL THERAPY | Facility: CLINIC | Age: 58
End: 2023-12-22
Payer: COMMERCIAL

## 2023-12-22 ENCOUNTER — TREATMENT (OUTPATIENT)
Dept: PHYSICAL THERAPY | Facility: CLINIC | Age: 58
End: 2023-12-22
Payer: COMMERCIAL

## 2023-12-22 DIAGNOSIS — Z96.698 S/P FINGER JOINT REPLACEMENT: Primary | ICD-10-CM

## 2023-12-22 PROCEDURE — L3808 WHFO, RIGID W/O JOINTS: HCPCS | Performed by: PHYSICAL THERAPIST

## 2023-12-22 NOTE — PROGRESS NOTES
Ezio Becerra 1965   Diagnosis/ Surgery: s/p left 1st CMCJ arthroplasty               Date Of Injury: chronic   Date Of Surgery:12/12/23    Hand Dominance: right   History of Present Condition: progressive arthritis   Medical/Vocational History/ Medications:  - desk work     Pain: 3/10 pain at rest     Edema: minimal   Sensibility: WNL   Wound Status: healing as expected   ROM/ Strength: NT     Splinting:  Patient was measure and fit with a custom fabricated forearm based thumb immobilization splint.    Patient was instructed in wearing schedule, precautions and care of the splint during this visit.   Patient was instructed in proper donning/doffing of splint.   Assessment:  Patient was fitted and appropriate splint was fabricated this date.  Patient reported that splint was comfortable and had no complications with the fit of the splint.  Patient was instructed and patient verbalized understanding of precautions, wear and care of the splint.   Patient demonstrated independent donning/doffing of splint during treatment today.  Goals:  Patient was fitted properly with appropriate splint for diagnosis  Patient was educated on precautions, wear schedule and care of splint  Patient demonstrated independence with donning/doffing of the splint.  Splint was provided to Protect Healing Structures, Restrict Mobility, Improve joint alignment.  Plan:  No additional treatment is required for this patient at this time. The patient is therefore discharged from therapy.  Patient advised to contact therapist with any additional questions or concerns regarding the fit and function of the splint.  Patient will be seen for splint issues as needed   Wear Instructions: Off for hygiene           PT SIGNATURE: Jody Clement, PT   DATE TREATMENT INITIATED: 12/22/2023    Initial Certification  Certification Period: 3/21/2024  I certify that the therapy services are furnished while this patient is under my care.  The services  outlined above are required by this patient, and will be reviewed every 90 days.     PHYSICIAN: Kelsi Enriquez MD      DATE:     Please sign and return via fax to [phone].. Thank you, [facility].

## 2023-12-29 RX ORDER — NAPROXEN 500 MG/1
500 TABLET ORAL 2 TIMES DAILY PRN
Qty: 90 TABLET | Refills: 1 | Status: SHIPPED | OUTPATIENT
Start: 2023-12-29

## 2024-05-23 RX ORDER — VALACYCLOVIR HYDROCHLORIDE 1 G/1
1000 TABLET, FILM COATED ORAL DAILY
Qty: 90 TABLET | Refills: 3 | Status: SHIPPED | OUTPATIENT
Start: 2024-05-23

## 2024-07-23 RX ORDER — FLUTICASONE PROPIONATE 50 MCG
SPRAY, SUSPENSION (ML) NASAL
Qty: 48 G | Refills: 3 | Status: SHIPPED | OUTPATIENT
Start: 2024-07-23

## 2025-05-23 ENCOUNTER — TELEPHONE (OUTPATIENT)
Dept: INTERNAL MEDICINE | Facility: CLINIC | Age: 60
End: 2025-05-23

## 2025-05-23 NOTE — TELEPHONE ENCOUNTER
Pt came in and apt on 5/23 and 6/5 were canceled so he would like to reschedule a physical at soonest convenience. Pls advise

## 2025-06-09 ENCOUNTER — TELEPHONE (OUTPATIENT)
Dept: INTERNAL MEDICINE | Facility: CLINIC | Age: 60
End: 2025-06-09
Payer: COMMERCIAL

## 2025-06-09 NOTE — TELEPHONE ENCOUNTER
Pt returned Latasha's call from today. Was unsure what needed to be relayed to pt. Pt would like a cb when available.

## 2025-06-13 ENCOUNTER — OFFICE VISIT (OUTPATIENT)
Dept: INTERNAL MEDICINE | Facility: CLINIC | Age: 60
End: 2025-06-13
Payer: COMMERCIAL

## 2025-06-13 VITALS
WEIGHT: 195 LBS | BODY MASS INDEX: 27.3 KG/M2 | RESPIRATION RATE: 16 BRPM | SYSTOLIC BLOOD PRESSURE: 104 MMHG | HEIGHT: 71 IN | HEART RATE: 78 BPM | TEMPERATURE: 97.8 F | OXYGEN SATURATION: 97 % | DIASTOLIC BLOOD PRESSURE: 70 MMHG

## 2025-06-13 DIAGNOSIS — Z00.00 ROUTINE GENERAL MEDICAL EXAMINATION AT A HEALTH CARE FACILITY: ICD-10-CM

## 2025-06-13 DIAGNOSIS — L40.9 PSORIASIS: Primary | ICD-10-CM

## 2025-06-13 NOTE — PROGRESS NOTES
Chief Complaint   Patient presents with    Annual Exam    Shortness of Breath     X couple years       Ezio Becerra is a 59 y.o. male and is here for a comprehensive physical exam. The patient reports problems - psoriasis.     History:  Erectile dysfunction  yes  Nocturia  yes      Do you take any herbs or supplements that were not prescribed by a doctor? no    Are you taking aspirin daily? no      Health Habits:  Dental Exam. up to date  Eye Exam. unknown  Exercise: 5 times/week.  Current exercise activities include: walking and yard work    Health Maintenance   Topic Date Due    ANNUAL PHYSICAL  04/14/2024    INFLUENZA VACCINE  07/01/2025    COLORECTAL CANCER SCREENING  08/11/2027    TDAP/TD VACCINES (5 - Td or Tdap) 11/14/2029    HEPATITIS C SCREENING  Completed    COVID-19 Vaccine  Completed    Pneumococcal Vaccine 50+  Completed    ZOSTER VACCINE  Completed       PMH, PSH, SocHx, FamHx, Allergies, and Medications: Reviewed and updated in the Visit Navigator.     Allergies   Allergen Reactions    Septra [Sulfamethoxazole-Trimethoprim]      Past Medical History:   Diagnosis Date    Allergic 1982    Stopped allergy shots approx 15 yrs ago.  Use Flonase/Xyzol    DDD (degenerative disc disease), cervical     DDD (degenerative disc disease), lumbar     Erectile dysfunction ~2005    Plantar fasciitis     BILATERAL    Psoriatic arthritis     Seasonal allergies      Past Surgical History:   Procedure Laterality Date    COLONOSCOPY  2012    COLONOSCOPY N/A 8/11/2017    Procedure: COLONOSCOPY;  Surgeon: Joseph Rucker MD;  Location: [facility] ENDOSCOPY;  Service:     WISDOM TOOTH EXTRACTION       Social History     Socioeconomic History    Marital status:    Tobacco Use    Smoking status: Never    Smokeless tobacco: Never    Tobacco comments:     Probably smoked less than 10 cigars in my life.   Vaping Use    Vaping status: Never Used   Substance and Sexual Activity    Alcohol use: Yes     Comment: about 1  "drink per  month    Drug use: No    Sexual activity: Yes     Partners: Female     Birth control/protection: Post-menopausal, None     Comment: Wife - hysterectomy     Family History   Problem Relation Age of Onset    Other Mother         Ulcerative Colitis, diagnosed ~ 75 yrs old    COPD Father         Smoker    Ovarian cancer Sister     Cancer Maternal Grandmother         diagnosed ~ 85 years old    Cancer Sister         , ovarian cancer, age 50       Review of Systems  Review of Systems   Constitutional: Negative.  Negative for activity change, appetite change, fatigue and fever.   HENT:  Negative for congestion, ear discharge, ear pain and trouble swallowing.    Eyes:  Negative for photophobia and visual disturbance.   Respiratory:  Negative for cough and shortness of breath.    Cardiovascular:  Negative for chest pain and palpitations.   Gastrointestinal:  Negative for abdominal distention, abdominal pain, constipation, diarrhea, nausea and vomiting.   Endocrine: Negative.    Genitourinary:  Negative for dysuria, hematuria and urgency.   Musculoskeletal:  Positive for arthralgias. Negative for back pain, joint swelling and myalgias.   Skin:  Negative for color change and rash.   Allergic/Immunologic: Negative.    Neurological:  Negative for dizziness, weakness, light-headedness and headaches.   Hematological:  Negative for adenopathy. Does not bruise/bleed easily.   Psychiatric/Behavioral:  Negative for agitation, confusion and dysphoric mood. The patient is not nervous/anxious.        Vitals:    25 1149   BP: 104/70   Pulse: 78   Resp: 16   Temp: 97.8 °F (36.6 °C)   SpO2: 97%       Objective   /70   Pulse 78   Temp 97.8 °F (36.6 °C)   Resp 16   Ht 180.3 cm (71\")   Wt 88.5 kg (195 lb)   SpO2 97%   BMI 27.20 kg/m²     Physical Exam  Constitutional:       General: He is not in acute distress.     Appearance: He is well-developed.   HENT:      Nose: Nose normal.   Eyes:      General: No " scleral icterus.     Conjunctiva/sclera: Conjunctivae normal.   Neck:      Thyroid: No thyromegaly.      Trachea: No tracheal deviation.   Cardiovascular:      Rate and Rhythm: Normal rate and regular rhythm.      Heart sounds: No murmur heard.     No friction rub.   Pulmonary:      Effort: No respiratory distress.      Breath sounds: No wheezing or rales.   Abdominal:      General: There is no distension.      Palpations: Abdomen is soft. There is no mass.      Tenderness: There is no abdominal tenderness. There is no guarding.   Musculoskeletal:         General: Deformity present. Normal range of motion.   Lymphadenopathy:      Cervical: No cervical adenopathy.   Skin:     General: Skin is warm and dry.      Findings: No erythema or rash.   Neurological:      Mental Status: He is alert and oriented to person, place, and time.      Cranial Nerves: No cranial nerve deficit.      Coordination: Coordination normal.      Deep Tendon Reflexes: Reflexes are normal and symmetric.   Psychiatric:         Behavior: Behavior normal.         Thought Content: Thought content normal.         Judgment: Judgment normal.       The 10-year ASCVD risk score (William ELAINE, et al., 2019) is: 6.3%    Values used to calculate the score:      Age: 59 years      Sex: Male      Is Non- : No      Diabetic: No      Tobacco smoker: No      Systolic Blood Pressure: 104 mmHg      Is BP treated: No      HDL Cholesterol: 39 mg/dL      Total Cholesterol: 172 mg/dL     Lab Results   Component Value Date    CHOL 185 08/01/2017    CHLPL 172 04/14/2023    TRIG 104 04/14/2023    HDL 39 (L) 04/14/2023     (H) 04/14/2023     Glucose   Date Value Ref Range Status   05/12/2023 90 65 - 99 mg/dL Final   10/09/2017 73 70 - 100 mg/dL Final     BUN   Date Value Ref Range Status   05/12/2023 16 6 - 20 mg/dL Final   10/09/2017 16 9 - 23 mg/dL Final     Creatinine   Date Value Ref Range Status   05/12/2023 1.02 0.76 - 1.27 mg/dL Final    10/09/2017 1.00 0.60 - 1.30 mg/dL Final     Sodium   Date Value Ref Range Status   05/12/2023 140 136 - 145 mmol/L Final   10/09/2017 142 132 - 146 mmol/L Final     Potassium   Date Value Ref Range Status   05/12/2023 4.9 3.5 - 5.2 mmol/L Final   10/09/2017 4.1 3.5 - 5.5 mmol/L Final     Chloride   Date Value Ref Range Status   05/12/2023 103 98 - 107 mmol/L Final   10/09/2017 110 (H) 99 - 109 mmol/L Final     CO2   Date Value Ref Range Status   10/09/2017 26.0 20.0 - 31.0 mmol/L Final     Total CO2   Date Value Ref Range Status   05/12/2023 27.6 22.0 - 29.0 mmol/L Final     Calcium   Date Value Ref Range Status   05/12/2023 10.3 8.6 - 10.5 mg/dL Final   10/09/2017 9.4 8.7 - 10.4 mg/dL Final     Total Protein   Date Value Ref Range Status   04/14/2023 7.2 6.0 - 8.5 g/dL Final   10/09/2017 7.0 5.7 - 8.2 g/dL Final     Albumin   Date Value Ref Range Status   04/14/2023 4.5 3.5 - 5.2 g/dL Final   10/09/2017 4.20 3.20 - 4.80 g/dL Final     ALT (SGPT)   Date Value Ref Range Status   04/14/2023 21 1 - 41 U/L Final   10/09/2017 39 7 - 40 U/L Final     AST (SGOT)   Date Value Ref Range Status   04/14/2023 17 1 - 40 U/L Final   10/09/2017 27 0 - 33 U/L Final     Alkaline Phosphatase   Date Value Ref Range Status   04/14/2023 96 39 - 117 U/L Final   10/09/2017 97 25 - 100 U/L Final     Total Bilirubin   Date Value Ref Range Status   04/14/2023 <0.2 0.0 - 1.2 mg/dL Final   10/09/2017 0.5 0.3 - 1.2 mg/dL Final     eGFR Non  Am   Date Value Ref Range Status   10/30/2020 67 >60 mL/min/1.73 Final     eGFR Non  Amer   Date Value Ref Range Status   10/09/2017 78 >60 mL/min/1.73 Final     BUN/Creatinine Ratio   Date Value Ref Range Status   05/12/2023 15.7 7.0 - 25.0 Final   10/09/2017 16.0 7.0 - 25.0 Final     Anion Gap   Date Value Ref Range Status   10/09/2017 6.0 3.0 - 11.0 mmol/L Final     Lab Results   Component Value Date    WBC 8.63 02/02/2018    HGB 15.3 02/02/2018    HCT 44.0 02/02/2018    MCV 95.9 (H)  02/02/2018     02/02/2018       Assessment & Plan   1. Healthy male exam.   2. Patient Counseling: Including but not Limited to the following, when appropriate:  --Nutrition: Stressed importance of moderation in sodium/caffeine intake, saturated fat and cholesterol, caloric balance, sufficient intake of fresh fruits, vegetables, fiber    --Exercise: Stressed the importance of regular exercise.   --Substance Abuse: Discussed cessation/primary prevention of tobacco, alcohol, or other drug use; driving or other dangerous activities under the influence; availability of treatment for abuse, as indicated based on social history.    --Sexuality: Discussed sexually transmitted diseases, partner selection, use of condoms and contraceptive alternatives.   --Injury prevention: Discussed safety belts, safety helmets, smoke detector, smoking near bedding or upholstery.   --Dental health: Discussed importance of regular tooth brushing, flossing, and dental visits.  --Immunizations reviewed.  --Discussed benefits of colon cancer screening.      3. Discussed the patient's BMI with him. BMI is >= 25 and <30. (Overweight) The following options were offered after discussion;: exercise counseling/recommendations and nutrition counseling/recommendations  . The BMI is above average; BMI management plan is completed  4. No follow-ups on file.  5. Age-appropriate Screening Scheduled  6. There are no Patient Instructions on file for this visit.    Assessment & Plan     Diagnoses and all orders for this visit:    1. Psoriasis (Primary)    2. Routine general medical examination at a health care facility  -     Lipid Panel  -     Hemoglobin A1c  -     Comprehensive Metabolic Panel  -     CBC (No Diff)

## 2025-06-20 LAB
ALBUMIN SERPL-MCNC: 4.1 G/DL (ref 3.5–5.2)
ALBUMIN/GLOB SERPL: 1.5 G/DL
ALP SERPL-CCNC: 100 U/L (ref 39–117)
ALT SERPL-CCNC: 25 U/L (ref 1–41)
AST SERPL-CCNC: 20 U/L (ref 1–40)
BILIRUB SERPL-MCNC: 0.3 MG/DL (ref 0–1.2)
BUN SERPL-MCNC: 19 MG/DL (ref 8–23)
BUN/CREAT SERPL: 16.4 (ref 7–25)
CALCIUM SERPL-MCNC: 9.8 MG/DL (ref 8.6–10.5)
CHLORIDE SERPL-SCNC: 105 MMOL/L (ref 98–107)
CHOLEST SERPL-MCNC: 184 MG/DL (ref 0–200)
CO2 SERPL-SCNC: 25.2 MMOL/L (ref 22–29)
CREAT SERPL-MCNC: 1.16 MG/DL (ref 0.76–1.27)
EGFRCR SERPLBLD CKD-EPI 2021: 72.1 ML/MIN/1.73
ERYTHROCYTE [DISTWIDTH] IN BLOOD BY AUTOMATED COUNT: 15 % (ref 12.3–15.4)
GLOBULIN SER CALC-MCNC: 2.7 GM/DL
GLUCOSE SERPL-MCNC: 90 MG/DL (ref 65–99)
HBA1C MFR BLD: 5.7 % (ref 4.8–5.6)
HCT VFR BLD AUTO: 47.2 % (ref 37.5–51)
HDLC SERPL-MCNC: 36 MG/DL (ref 40–60)
HGB BLD-MCNC: 15.5 G/DL (ref 13–17.7)
LDLC SERPL CALC-MCNC: 124 MG/DL (ref 0–100)
MCH RBC QN AUTO: 30.7 PG (ref 26.6–33)
MCHC RBC AUTO-ENTMCNC: 32.8 G/DL (ref 31.5–35.7)
MCV RBC AUTO: 93.5 FL (ref 79–97)
PLATELET # BLD AUTO: 240 10*3/MM3 (ref 140–450)
POTASSIUM SERPL-SCNC: 4.4 MMOL/L (ref 3.5–5.2)
PROT SERPL-MCNC: 6.8 G/DL (ref 6–8.5)
RBC # BLD AUTO: 5.05 10*6/MM3 (ref 4.14–5.8)
SODIUM SERPL-SCNC: 140 MMOL/L (ref 136–145)
TRIGL SERPL-MCNC: 132 MG/DL (ref 0–150)
VLDLC SERPL CALC-MCNC: 24 MG/DL (ref 5–40)
WBC # BLD AUTO: 8.48 10*3/MM3 (ref 3.4–10.8)

## 2025-06-27 RX ORDER — VALACYCLOVIR HYDROCHLORIDE 1 G/1
1000 TABLET, FILM COATED ORAL DAILY
Qty: 90 TABLET | Refills: 3 | Status: SHIPPED | OUTPATIENT
Start: 2025-06-27

## (undated) DEVICE — MEDI-VAC NON-CONDUCTIVE SUCTION TUBING: Brand: CARDINAL HEALTH

## (undated) DEVICE — JELLY,LUBE,STERILE,FLIP TOP,TUBE,2-OZ: Brand: MEDLINE

## (undated) DEVICE — ENDOGATOR AUXILIARY WATER JET CONNECTOR: Brand: ENDOGATOR

## (undated) DEVICE — Device

## (undated) DEVICE — PAD GRND REM POLYHESIVE A/ DISP

## (undated) DEVICE — SYR LUER SLPTP 50ML

## (undated) DEVICE — GLV SURG SENSICARE W/ALOE PF LF 7.5 STRL